# Patient Record
Sex: FEMALE | Race: WHITE | URBAN - METROPOLITAN AREA
[De-identification: names, ages, dates, MRNs, and addresses within clinical notes are randomized per-mention and may not be internally consistent; named-entity substitution may affect disease eponyms.]

---

## 2018-01-05 NOTE — TELEPHONE ENCOUNTER
APPT INFO    Date /Time: 1/10/18 at 2PM   Reason for Appt: NBS   Ref Provider/Clinic: Self   Are there internal records? Yes/No?  IF YES, list clinic names: No   Are there outside records? Yes/No? Yes   Patient Contact (Y/N) & Call Details: Called and spoke to pt, she has not seen anyone for this yet.  She did have her yearly physical last month and had labs done.  Emailed VALENTE to [email or web address]   Action:      OUTSIDE RECORDS CHECKLIST     CLINIC NAME COMMENTS REC (x) IMG (x)   [facility] Labs X none

## 2018-01-08 ENCOUNTER — CARE COORDINATION (OUTPATIENT)
Dept: SURGERY | Facility: CLINIC | Age: 38
End: 2018-01-08

## 2018-01-08 NOTE — TELEPHONE ENCOUNTER
Records Received From: [facility]     Date/Exam/Location  (specify location if different)   Office Notes: 12/21/17, 10/17/16, 10/13/16   Labs: 2017

## 2018-01-08 NOTE — PROGRESS NOTES
Patient interested in weight loss surgery.  Name: discuss recommendations  PCP: Farideh Bansal  Referred by: self    Ht: 5'4  Wt: 270  BMI: 46.34    Occupation/work status:     Insurance: [employer]  Instructed to check with insurance company regarding exclusions prior to first appt.    I would like to ask you some questions to see if you are a candidate for weight loss surgery with our program.    Verify Obesity related Co-Morbidities:  _n_Diabetes Mellitus  __I or__ II,         _n_Hypertension, __on meds  _n_High cholesterol, __ on meds  _n_Sleep Apnea,         __CPAP  _n_GERD, __on meds  _n_Degenerative Joint Disease, __ on meds    Other:  _n_Fibromyalgia  _n_Chronic abdominal pain, __treatment  _n_Dialysis    Abdominal Surgeries:  no    Psychiatric History:  _n_Depression, __ treatment:__  _n_Anxiety, __treatment:___  _n_Schizophrenia,  __ treatment:__  _n_Borderline personality disorder, __treatment  _n_Bipolar type I or type II, treatment: ___  _n_Suicide Attempts:   _n_Hospitalizations for mental health concerns:   Update/review Problem List   Occ. Beer, no smoking, no drugs    Support Systems:  , family friends    Scheduled to see Johnna Romero PA-C at 2 pm on 1/10/18, followed by an appt with a dietician.  Instructed to view seminar and complete pre-visit questionnaires prior to visit at [facility].

## 2018-01-10 ENCOUNTER — PRE VISIT (OUTPATIENT)
Dept: SURGERY | Facility: CLINIC | Age: 38
End: 2018-01-10

## 2018-01-10 ENCOUNTER — ALLIED HEALTH/NURSE VISIT (OUTPATIENT)
Dept: SURGERY | Facility: CLINIC | Age: 38
End: 2018-01-10
Payer: COMMERCIAL

## 2018-01-10 ENCOUNTER — OFFICE VISIT (OUTPATIENT)
Dept: SURGERY | Facility: CLINIC | Age: 38
End: 2018-01-10
Payer: COMMERCIAL

## 2018-01-10 ENCOUNTER — APPOINTMENT (OUTPATIENT)
Dept: LAB | Facility: CLINIC | Age: 38
End: 2018-01-10
Payer: COMMERCIAL

## 2018-01-10 VITALS
SYSTOLIC BLOOD PRESSURE: 120 MMHG | WEIGHT: 269.1 LBS | OXYGEN SATURATION: 98 % | DIASTOLIC BLOOD PRESSURE: 73 MMHG | BODY MASS INDEX: 44.83 KG/M2 | TEMPERATURE: 98.2 F | HEIGHT: 65 IN | HEART RATE: 67 BPM

## 2018-01-10 DIAGNOSIS — E66.01 MORBID OBESITY (H): Primary | ICD-10-CM

## 2018-01-10 LAB
ALBUMIN SERPL-MCNC: 3.6 G/DL (ref 3.4–5)
ALP SERPL-CCNC: 82 U/L (ref 40–150)
ALT SERPL W P-5'-P-CCNC: 62 U/L (ref 0–50)
ANION GAP SERPL CALCULATED.3IONS-SCNC: 7 MMOL/L (ref 3–14)
AST SERPL W P-5'-P-CCNC: 32 U/L (ref 0–45)
BILIRUB SERPL-MCNC: 0.4 MG/DL (ref 0.2–1.3)
BUN SERPL-MCNC: 16 MG/DL (ref 7–30)
CALCIUM SERPL-MCNC: 8.7 MG/DL (ref 8.5–10.1)
CHLORIDE SERPL-SCNC: 96 MMOL/L (ref 94–109)
CO2 SERPL-SCNC: 28 MMOL/L (ref 20–32)
CREAT SERPL-MCNC: 0.72 MG/DL (ref 0.52–1.04)
DEPRECATED CALCIDIOL+CALCIFEROL SERPL-MC: 21 UG/L (ref 20–75)
ERYTHROCYTE [DISTWIDTH] IN BLOOD BY AUTOMATED COUNT: 12.4 % (ref 10–15)
GFR SERPL CREATININE-BSD FRML MDRD: >90 ML/MIN/1.7M2
GLUCOSE SERPL-MCNC: 154 MG/DL (ref 70–99)
HCT VFR BLD AUTO: 40.7 % (ref 35–47)
HGB BLD-MCNC: 13.9 G/DL (ref 11.7–15.7)
MCH RBC QN AUTO: 29.5 PG (ref 26.5–33)
MCHC RBC AUTO-ENTMCNC: 34.2 G/DL (ref 31.5–36.5)
MCV RBC AUTO: 86 FL (ref 78–100)
PLATELET # BLD AUTO: 351 10E9/L (ref 150–450)
POTASSIUM SERPL-SCNC: 4 MMOL/L (ref 3.4–5.3)
PROT SERPL-MCNC: 8.3 G/DL (ref 6.8–8.8)
PTH-INTACT SERPL-MCNC: 62 PG/ML (ref 12–72)
RBC # BLD AUTO: 4.71 10E12/L (ref 3.8–5.2)
SODIUM SERPL-SCNC: 131 MMOL/L (ref 133–144)
WBC # BLD AUTO: 13.9 10E9/L (ref 4–11)

## 2018-01-10 RX ORDER — TRIAMTERENE/HYDROCHLOROTHIAZID 37.5-25 MG
1 TABLET ORAL EVERY MORNING
COMMUNITY

## 2018-01-10 NOTE — MR AVS SNAPSHOT
"                  MRN:[id number]                      After Visit Summary   1/10/2018    Megan Leos    MRN: [id number]           Visit Information        Provider Department      1/10/2018 1:30 PM Soo Simms RD [facility]        Care Instructions    GOALS:  Relating To Eating:  Eat slowly (20-30 minutes per meal), chewing foods well (25 chews per bite/applesauce consistency)  9\" Plate method (1/2 plate non-starchy vegetables/fruit, 1/4 plate lean protein, 1/4 plate whole grain starch - no more than 1/2 cup carb/meal)  Avoid snacking or 1 planned snack  Pack lunch for work at least once per week    Relating to beverages:  Separate fluids from meals by 30 minutes before, during, and after eating    Relating to dietary supplements:  Start a multivitamin containing iron daily    Relating to activity:  Increase activity level.  Exercise 2 days/week for 30 minutes    Soo Simms RD, LD    If you need to schedule or reschedule with a dietitian please call [phone].           [employer] Information     [employer] gives you secure access to your electronic health record. If you see a primary care provider, you can also send messages to your care team and make appointments. If you have questions, please call your primary care clinic.  If you do not have a primary care provider, please call [phone] and they will assist you.      [employer] is an electronic gateway that provides easy, online access to your medical records. With [employer], you can request a clinic appointment, read your test results, renew a prescription or communicate with your care team.     To access your existing account, please contact your [facility] or call [phone] for assistance.        Care EveryWhere ID     This is your Care EveryWhere ID. This could be used by other organizations to access your [facility] medical records  [id number]        Equal Access to Services     MONICA" GAAR : Hadii meghann Serra, washolada luqadaha, qaybta kaalmada javad, simran jones. [facility] [phone].    ATENCIÓN: Si habla español, tiene a lang disposición servicios gratuitos de asistencia lingüística. Llame al [phone].    We comply with applicable federal civil rights laws and [place] laws. We do not discriminate on the basis of race, color, national origin, age, disability, sex, sexual orientation, or gender identity.

## 2018-01-10 NOTE — PATIENT INSTRUCTIONS
"GOALS:  Relating To Eating:  Eat slowly (20-30 minutes per meal), chewing foods well (25 chews per bite/applesauce consistency)  9\" Plate method (1/2 plate non-starchy vegetables/fruit, 1/4 plate lean protein, 1/4 plate whole grain starch - no more than 1/2 cup carb/meal)  Avoid snacking or 1 planned snack  Pack lunch for work at least once per week    Relating to beverages:  Separate fluids from meals by 30 minutes before, during, and after eating    Relating to dietary supplements:  Start a multivitamin containing iron daily    Relating to activity:  Increase activity level.  Exercise 2 days/week for 30 minutes    Soo Simms, DANY, LD    If you need to schedule or reschedule with a dietitian please call [phone].    "

## 2018-01-10 NOTE — PATIENT INSTRUCTIONS
"Watch our online seminar at [email or web address]    See RD in 1 month and monthly for 3 months    [employer] phone calls x 5.     Labs today first floor     Bariatric Task List  Status:  Is patient a candidate for bariatric surgery?:  Yes -     Status:  surgery evaluation in process -     Surgeon: Dr Young -     Tentative surgery month/year: April 2018 -        Insurance: Insurance:  HP -     HP Referral:  Needed - best time is anytime on cell      Patient Info: Initial Weight:  269 lb 1.6 oz -     Date of Initial Weight/Height:  1/10/2018 -     Goal Weight (lbs):  259 -     Required Weight Loss:  10 -     Surgery Type:  sleeve gastrectomy -        Dietician Visits: Structured weight loss required by insurance?:  Yes -     Dietician Visit 1:  Completed -     Dietician Visit 2:  Needed -     Dietician Visit 3:  Needed -        Psychological Evaluation: Psych eval:  Needed -        Lab Work: Complete Blood Count:  Needed -     Comprehensive Metabolic Panel:  Needed -     Vitamin D:  Needed -     Hgb A1c:  Needed -     PTH:  Needed -        PCP: Establish care with PCP:  Completed -     PCP letter of support:  Needed -        Patient Education:  Information Session:  Needed -     Given \"Making your decision\" handout?:  Yes -     Given support group information?:  Yes -     Attended support group?:    -     Support plan in place?:  Completed -     Research consents signed?:  Yes -        Final Tasks:  Before surgery online class:  Needed -     Before surgery online class website link:  [email or web address]   After surgery online class:  Needed -     After surgery online class website link:  [email or web address]   Nurse visit for weigh-in and information:  Needed -     Pre-assessment clinic visit with anesthesia team for H&P:  Needed -     Final labs (Hgb, plt, T&S, UA):  Needed -        Notes:   -           "

## 2018-01-10 NOTE — MR AVS SNAPSHOT
"              After Visit Summary   1/10/2018    Megan Leos    MRN: [id number]           Patient Information     Date Of Birth          1980        Visit Information        Provider Department      1/10/2018 2:00 PM Johnna Romero PA-C [facility]        Today's Diagnoses     Morbid obesity (H)    -  1      Care Instructions    Watch our online seminar at [email or web address]    See RD in 1 month and monthly for 3 months    [facility] phone calls x 5.     Labs today first floor     Bariatric Task List  Status:  Is patient a candidate for bariatric surgery?:  Yes -     Status:  surgery evaluation in process -     Surgeon: Dr Young -     Tentative surgery month/year: April 2018 -        Insurance: Insurance:  HP -     HP Referral:  Needed - best time is anytime on cell      Patient Info: Initial Weight:  269 lb 1.6 oz -     Date of Initial Weight/Height:  1/10/2018 -     Goal Weight (lbs):  259 -     Required Weight Loss:  10 -     Surgery Type:  sleeve gastrectomy -        Dietician Visits: Structured weight loss required by insurance?:  Yes -     Dietician Visit 1:  Completed -     Dietician Visit 2:  Needed -     Dietician Visit 3:  Needed -        Psychological Evaluation: Psych eval:  Needed -        Lab Work: Complete Blood Count:  Needed -     Comprehensive Metabolic Panel:  Needed -     Vitamin D:  Needed -     Hgb A1c:  Needed -     PTH:  Needed -        PCP: Establish care with PCP:  Completed -     PCP letter of support:  Needed -        Patient Education:  Information Session:  Needed -     Given \"Making your decision\" handout?:  Yes -     Given support group information?:  Yes -     Attended support group?:    -     Support plan in place?:  Completed -     Research consents signed?:  Yes -        Final Tasks:  Before surgery online class:  Needed -     Before surgery online class website link:  [email or web address]   After surgery online class:  " "Needed -     After surgery online class website link:  [email or web address]   Nurse visit for weigh-in and information:  Needed -     Pre-assessment clinic visit with anesthesia team for H&P:  Needed -     Final labs (Hgb, plt, T&S, UA):  Needed -        Notes:   -                   Follow-ups after your visit        Who to contact     Please call your clinic at [phone] to:    Ask questions about your health    Make or cancel appointments    Discuss your medicines    Learn about your test results    Speak to your doctor   If you have compliments or concerns about an experience at your clinic, or if you wish to file a complaint, please contact [facility] Patient Relations at [phone] or email us at [email or web address]         Additional Information About Your Visit        [employer] Information     [employer] gives you secure access to your electronic health record. If you see a primary care provider, you can also send messages to your care team and make appointments. If you have questions, please call your primary care clinic.  If you do not have a primary care provider, please call [phone] and they will assist you.      [employer] is an electronic gateway that provides easy, online access to your medical records. With [employer], you can request a clinic appointment, read your test results, renew a prescription or communicate with your care team.     To access your existing account, please contact your [facility] or call [phone] for assistance.        Care EveryWhere ID     This is your Care EveryWhere ID. This could be used by other organizations to access your [facility] medical records  [id number]        Your Vitals Were     Pulse Temperature Height Pulse Oximetry BMI (Body Mass Index)       67 98.2  F (36.8  C) (Oral) 5' 4.57\" 98% 45.38 kg/m2        Blood Pressure from Last 3 Encounters:   01/10/18 120/73    Weight " from Last 3 Encounters:   01/10/18 269 lb 1.6 oz              We Performed the Following     CBC with platelets     Comprehensive metabolic panel     Parathyroid Hormone Intact     Vitamin D Deficiency        Primary Care Provider Office Phone # Fax #    BERENICE Fontana, NORMAN [phone]       [facility] [address]  [employer] [address]        Equal Access to Services     [facility]: Hadii aad ku hadasho Soomaali, waaxda luqadaha, qaybta kaalmada adeegyada, waxay idiin hayaan adeeg kharash la'aan . So [facility] [phone].    ATENCIÓN: Si habla español, tiene a lang disposición servicios gratuitos de asistencia lingüística. Sharan al [phone].    We comply with applicable federal civil rights laws and [place] laws. We do not discriminate on the basis of race, color, national origin, age, disability, sex, sexual orientation, or gender identity.            Thank you!     Thank you for choosing [facility]  for your care. Our goal is always to provide you with excellent care. Hearing back from our patients is one way we can continue to improve our services. Please take a few minutes to complete the written survey that you may receive in the mail after your visit with us. Thank you!             Your Updated Medication List - Protect others around you: Learn how to safely use, store and throw away your medicines at www.disposemymeds.org.          This list is accurate as of: 1/10/18  3:00 PM.  Always use your most recent med list.                   Brand Name Dispense Instructions for use Diagnosis    PREDNISONE PO      Take 10 mg by mouth        triamterene-hydrochlorothiazide 37.5-25 MG per tablet    MAXZIDE-25     Take 1 tablet by mouth daily

## 2018-01-10 NOTE — NURSING NOTE
"(   Chief Complaint   Patient presents with     Consult     NBS, BMI 46.3    )    ( Weight: 269 lb 1.6 oz )  ( Height: 5' 4.57\" )  ( BMI (Calculated): 45.48 )  ( Initial Weight: 269 lb 1.6 oz )  ( Cumulative weight loss (lbs): 0 )  (   )  (   )  ( Waist Circumference (cm): 133 cm )  (   )    ( BP: 120/73 )  (   )  ( Temp: 98.2  F (36.8  C) )  ( Temp src: Oral )  ( Pulse: 67 )  (   )  ( SpO2: 98 % )    ( There is no problem list on file for this patient.   )  (   Current Outpatient Prescriptions   Medication Sig Dispense Refill     PREDNISONE PO Take 10 mg by mouth       triamterene-hydrochlorothiazide (MAXZIDE-25) 37.5-25 MG per tablet Take 1 tablet by mouth daily      )  ( Diabetes Eval:    )    ( Pain Eval:  Data Unavailable )    ( Wound Eval:       )    (   History   Smoking Status     Former Smoker     Packs/day: 0.50     Years: 5.00     Types: Cigarettes     Start date: 1/7/2011     Quit date: 10/27/2016   Smokeless Tobacco     Never Used    )    ( Signed By:  Mallory Hughes; January 10, 2018; 2:50 PM )    "

## 2018-01-10 NOTE — PROGRESS NOTES
"New Bariatric Nutrition Consultation Note    Reason For Visit: Nutrition Assessment    Megan eLos is a 37 year old presenting today for new bariatric nutrition consult.   Pt is interested in laparoscopic sleeve gastrectomy with Dr. Young expected surgery in April.  This is pt's first of 3 required nutrition visits prior to surgery. Pt referred by Johnna NG(1/10/18).     She is interested in having weight loss surgery for the following reasons:  Trying for the past 17 years, difficulty loosing weight    Support System Reviewed With Patient 1/4/2018   Who do you have in your support network that can be available to help you for the first 2 weeks after surgery?  - he is a stay at home parent and will be with me the whole time   Who can you count on for support throughout your weight loss surgery journey? , kids, friends       ANTHROPOMETRICS:  Estimated body mass index is 45.38 kg/(m^2) as calculated from the following:    Height as of an earlier encounter on 1/10/18: 1.64 m (5' 4.57\").    Weight as of an earlier encounter on 1/10/18: 122.1 kg (269 lb 1.6 oz).    Required weight loss goal pre-op: 10 lbs from initial consult weight (goal weight 259 lbs or less before surgery)       1/4/2018   I have tried the following methods to lose weight Watching portions or calories, Exercise, Weight Watchers, Tami Brian, Pre packaged meals ex: Nutrisystem, Slimfast, OTC Medications       Weight Loss Questions Reviewed With Patient 1/4/2018   How long have you been overweight? Following one or more pregnancies       SUPPLEMENT INFORMATION:  Vit C, Vit D, B12    NUTRITION HISTORY:  Recall Diet Questions Reviewed With Patient 1/4/2018   Describe what you typically consume for breakfast (typical or most recent): Oatmeal, Eggs with León, Bagels, Fruit   Describe what you typically consume for lunch (typical or most recent): Fast food if out and about(2-3 days per week), Salads   Describe what you typically " consume for supper (typical or most recent): we cook at home 4-5 days a week - chicken and rice, other meat & veggie. We eat out 2-3 times per week - we like to eat [place]im[place], Mexican, etc.   Describe what you typically consume as snacks (typical or most recent): almonds, trail mix, fruit, cheese (1-2 times per day)   How many ounces of water, or other low calorie drinks, do you drink daily (8 oz=1 glass)? 64 oz or more   How many ounces of caffeine (coffee, tea, pop) do you drink daily (8 oz=1 glass)? 16 oz   How often do you drink alcohol? 2-4 times a month   If you do drink alcohol, how many drinks might you have in a day? (one drink = 5 oz. wine, 1 can/bottle of beer, 1 shot liquor) 1 or 2       Eating Habits 1/4/2018   Do you have any dietary restrictions? No   Do you currently binge eat (eat a large amount of food in a short time)? No   Are you an emotional eater? No   Do you get up to eat after falling asleep? No   What foods do you crave? carbs       ADDITIONAL INFORMATION:    Dining Out History Reviewed With Patient 1/4/2018   How often do you dine out? A couple of times a week.   Where do you dine out? (select all that apply) sit-down restaurants, fast food chains, take out   What types of food do you order when you dine out? I like meat & potatoes - hearty meals       Physical Activity Reviewed With Patient 1/4/2018   How often do you exercise? 1 to 2 times per week   What is the duration of your exercise (in minutes)? 30 Minutes   What types of exercise do you do? walking, group fitness classes   What keeps you from being more active?  Pain, Lack of Time, Too tired       NUTRITION DIAGNOSIS:  Obesity r/t long history of self-monitoring deficit and excessive energy intake aeb BMI >30.    INTERVENTION:  Intervention Provided/Education Provided on post-op diet guidelines, vitamins/minerals essential post-operatively, GI anatomy of bariatric surgeries, ways to help prepare for post-op diet guidelines  "pre-operatively, portion/calorie-control, mindful eating sources of protein.  Pt stated she struggles with portions at meals and often goes back for seconds or thirds and is not big into snacking. Provided pt with list of goals RD contact information.      Questions Reviewed With Patient 1/4/2018   How ready are you to make changes regarding your weight? Number 1 = Not ready at all to make changes up to 10 = very ready. 10   How confident are you that you can change? 1 = Not confident that you will be successful making changes up to 10 = very confident. 10       Patient Understanding: good  Expected Compliance: good    GOALS:  Relating To Eating:  Eat slowly (20-30 minutes per meal), chewing foods well (25 chews per bite/applesauce consistency)  9\" Plate method (1/2 plate non-starchy vegetables/fruit, 1/4 plate lean protein, 1/4 plate whole grain starch - no more than 1/2 cup carb/meal)  Avoid snacking or 1 planned snack  Pack lunch for work at least once per week    Relating to beverages:  Separate fluids from meals by 30 minutes before, during, and after eating    Relating to dietary supplements:  Start a multivitamin containing iron daily    Relating to activity:  Increase activity level.  Exercise 2 days/week for 30 minutes      Time spent with patient: 30 minutes.  Soo Simms, DANY, LD    "

## 2018-01-10 NOTE — PROGRESS NOTES
"New Bariatric Surgery Consultation Note    RE: Megan Leos  MR#: [id number]  : 1980      Referring provider: No flowsheet data found.    Chief Complaint/Reason for visit: evaluation for possible weight loss surgery    Dear Farideh Bansal, PA, PA-C (General),    I had the pleasure of seeing your patient, Megan Leos, to evaluate her obesity and consider her for possible weight loss surgery. As you know, Megan Leos is 37 year old.  She has a height of 5' 4.567\", a weight of 269 lbs 1.6 oz, and calculated Body mass index is 45.38 kg/(m^2).       HISTORY OF PRESENT ILLNESS:  Weight Loss History Reviewed with Patient 2018   How long have you been overweight? Following one or more pregnancies   What is the most that you have ever weighed? 275   What is the most weight you have lost? 40   I have tried the following methods to lose weight Watching portions or calories, Exercise, Weight Watchers, Tami Brian, Pre packaged meals ex: Nutrisystem, Slimfast, OTC Medications   I have tried the following weight loss medications? (Check all that apply) None   Have you ever had weight loss surgery? No       CO-MORBIDITIES OF OBESITY INCLUDE:     2018   I have the following co-morbidities associated with obesity: Weight Bearing Joint Pain       PAST MEDICAL HISTORY:  Past Medical History:   Diagnosis Date     Hearing problem     Menieres     Vision disorder     Astigmatism       PAST SURGICAL HISTORY:  History reviewed. No pertinent surgical history.    FAMILY HISTORY:   Family History   Problem Relation Age of Onset     DIABETES Maternal Grandmother      Colon Cancer Maternal Grandmother       from complications with colon cancer in      Obesity Maternal Grandmother      Prostate Cancer Maternal Grandfather      cured after 1yr of treatments     Thrombophilia Sister      Thrombophilia Sister        SOCIAL HISTORY:   Social History Questions Reviewed With Patient 2018   Which best " describes your employment status (select all that apply) I work full-time   If you work, what is your occupation? [employer] /    Which best describes your marital status:    Do you have children? Yes   Who do you have in your support network that can be available to help you for the first 2 weeks after surgery?  - he is a stay at home parent and will be with me the whole time   Who can you count on for support throughout your weight loss surgery journey? , kids, friends   Can you afford 3 meals a day?  Yes   Can you afford 50-60 dollars a month for vitamins? Yes   3 kids 16, 14, 3    HABITS:     1/4/2018   How often do you drink alcohol? 2-4 times a month   If you do drink alcohol, how many drinks might you have in a day? (one drink = 5 oz. wine, 1 can/bottle of beer, 1 shot liquor) 1 or 2   Do you currently use any of the following Nicotine products? No   Have you ever used any of the following nicotine products? Cigarettes   If you previously used any of these products, what year did you quit? 2016   Have you or are you currently using street drugs or prescription strength medication for which you do not have a prescription for? No   Do you have a history of chemical dependency (alcohol or drug abuse)? No       PSYCHOLOGICAL HISTORY:   Psychological History Reviewed With Patient 1/4/2018   Have you ever attempted suicide? Never.   Have you had thoughts of suicide in the past year? No   Have you ever been hospitalized for mental illness or a suicide attempt? Never.   Do you have a history of chronic pain? No   Have you ever been diagnosed with fibromyalgia? No   Are you currently being treated for any of the following? (select all that apply) I do not have a mental illness       ROS:     1/4/2018   Skin:  None of the above   HEENT: Dizziness/lightheadedness, Missing teeth   If you answered yes to missing teeth, please indicate how many: 1   Musculoskeletal: Joint Pain,  "Back pain   Cardiovascular: None of the above   Pulmonary: Snoring   Gastrointestinal: Constipation   Genitourinary: None of the above   Hematological: None of the above   Neurological: None of the above   Female only: Regular menstrual cycles       EATING BEHAVIORS:     1/4/2018   Have you or anyone else thought that you had an eating disorder? No   Do you currently binge eat (eat a large amount of food in a short time)? No   Are you an emotional eater? No   Do you get up to eat after falling asleep? No       EXERCISE:     1/4/2018   How often do you exercise? 1 to 2 times per week   What is the duration of your exercise (in minutes)? 30 Minutes   What types of exercise do you do? walking, group fitness classes   What keeps you from being more active?  Pain, Lack of Time, Too tired       MEDICATIONS:  Current Outpatient Prescriptions   Medication     PREDNISONE PO     triamterene-hydrochlorothiazide (MAXZIDE-25) 37.5-25 MG per tablet     No current facility-administered medications for this visit.        ALLERGIES:  Allergies   Allergen Reactions     Penicillins Hives and Itching     Latex Rash       LABS/IMAGING/MEDICAL RECORDS REVIEW:     PHYSICAL EXAM:  /73  Pulse 67  Temp 98.2  F (36.8  C) (Oral)  Ht 5' 4.57\"  Wt 269 lb 1.6 oz  SpO2 98%  BMI 45.38 kg/m2  General: NAD  Neurologic: A & O x 3, gait normal  Head: normocephalic, atraumatic  HEENT: PERRL, EOMI.   Respiratory: respirations unlabored  Abdomen: Obese, Soft NT ND   Extremities: No LE swelling   Skin: warm and dry.  No rashes on exposed skin  Psychiatric: Mentation and Affect appear normal      In summary, Megan Leos has Class III obesity with a body mass index of Body mass index is 45.38 kg/(m^2). kg/m2 and the comorbidities stated above. She completed an informational seminar and is a candidate for the laparoscopic gastric sleeve.  She will have to complete the following pre-requisites:    Watch our online seminar at " "[email or web address]    See RD in 1 month and monthly for 3 months    [employer] phone calls x 5. Best time to call is anytime on cell phone    Labs today first floor     Bariatric Task List  Status:  Is patient a candidate for bariatric surgery?:  Yes -     Status:  surgery evaluation in process -     Surgeon: Dr Young -     Tentative surgery month/year: April 2018 -        Insurance: Insurance:  HP -     HP Referral:  Needed - best time is anytime on cell      Patient Info: Initial Weight:  269 lb 1.6 oz -     Date of Initial Weight/Height:  1/10/2018 -     Goal Weight (lbs):  259 -     Required Weight Loss:  10 -     Surgery Type:  sleeve gastrectomy -        Dietician Visits: Structured weight loss required by insurance?:  Yes -     Dietician Visit 1:  Completed -     Dietician Visit 2:  Needed -     Dietician Visit 3:  Needed -        Psychological Evaluation: Psych eval:  Needed -        Lab Work: Complete Blood Count:  Needed -     Comprehensive Metabolic Panel:  Needed -     Vitamin D:  Needed -     Hgb A1c:  Needed -     PTH:  Needed -        PCP: Establish care with PCP:  Completed -     PCP letter of support:  Needed -        Patient Education:  Information Session:  Needed -     Given \"Making your decision\" handout?:  Yes -     Given support group information?:  Yes -     Attended support group?:    -     Support plan in place?:  Completed -     Research consents signed?:  Yes -        Final Tasks:  Before surgery online class:  Needed -     Before surgery online class website link:  [email or web address]   After surgery online class:  Needed -     After surgery online class website link:  [email or web address]   Nurse visit for weigh-in and information:  Needed -     Pre-assessment clinic visit with anesthesia team for H&P:  Needed -     Final labs (Hgb, plt, T&S, UA):  Needed -        Notes:   -       Today in the office we discussed gastric sleeve surgery. Preoperative, " perioperative, and postoperative processes, management, and follow up were addressed.  Risks and benefits were outlined including the risk of death, staple line leak (1-2%), PE, DVT, ulcer, worsening GERD, N/V, stricture, hernia, wound infection, weight regain, and vitamin deficiencies. I emphasized exercise and activity along with appropriate food choice as the main foundation for weight loss with surgery providing surgical reinforcement of this.  All questions were answered.  A goal sheet and support group handout were given to the patient.      Once the patient has completed the requirements in their task list and there are no further recommendations, the pt will be allowed to see the surgeon of her choice for consultation on the laparoscopic gastric sleeve surgery. Patient verbalizes understanding of the process to surgery and expectations for the postoperative period including the need for lifelong lifestyle changes, vitamin supplementation, and laboratory monitoring.     Sincerely,    Johnna Romero PA-C    I spent a total of 30 minutes face to face with Megan during today's office visit. Over 50% of this time was spent counseling the patient and/or coordinating care.

## 2018-01-10 NOTE — LETTER
"1/10/2018       RE: Megan Leos  [address]  [facility] [address]     Dear Colleague,    Thank you for referring your patient, Megan Leos, to the [facility] SURGICAL WE[facility] MANAGEMENT at [facility]. Please see a copy of my visit note below.    New Bariatric Surgery Consultation Note    RE: Megan Leos  MR#: [id number]  : 1980      Referring provider: No flowsheet data found.    Chief Complaint/Reason for visit: evaluation for possible weight loss surgery    Dear Farideh Bansal, PA, PA-C (General),    I had the pleasure of seeing your patient, Megan Leos, to evaluate her obesity and consider her for possible weight loss surgery. As you know, Megan Leos is 37 year old.  She has a height of 5' 4.567\", a weight of 269 lbs 1.6 oz, and calculated Body mass index is 45.38 kg/(m^2).       HISTORY OF PRESENT ILLNESS:  Weight Loss History Reviewed with Patient 2018   How long have you been overweight? Following one or more pregnancies   What is the most that you have ever weighed? 275   What is the most weight you have lost? 40   I have tried the following methods to lose weight Watching portions or calories, Exercise, Weight Watchers, Tami Brian, Pre packaged meals ex: Nutrisystem, Slimfast, OTC Medications   I have tried the following weight loss medications? (Check all that apply) None   Have you ever had weight loss surgery? No       CO-MORBIDITIES OF OBESITY INCLUDE:     2018   I have the following co-morbidities associated with obesity: Weight Bearing Joint Pain       PAST MEDICAL HISTORY:  Past Medical History:   Diagnosis Date     Hearing problem     Menieres     Vision disorder     Astigmatism       PAST SURGICAL HISTORY:  History reviewed. No pertinent surgical history.    FAMILY HISTORY:   Family History   Problem Relation Age of Onset     DIABETES Maternal Grandmother      Colon Cancer Maternal Grandmother       from " complications with colon cancer in 2000     Obesity Maternal Grandmother      Prostate Cancer Maternal Grandfather      cured after 1yr of treatments     Thrombophilia Sister      Thrombophilia Sister        SOCIAL HISTORY:   Social History Questions Reviewed With Patient 1/4/2018   Which best describes your employment status (select all that apply) I work full-time   If you work, what is your occupation? [employer] /    Which best describes your marital status:    Do you have children? Yes   Who do you have in your support network that can be available to help you for the first 2 weeks after surgery?  - he is a stay at home parent and will be with me the whole time   Who can you count on for support throughout your weight loss surgery journey? , kids, friends   Can you afford 3 meals a day?  Yes   Can you afford 50-60 dollars a month for vitamins? Yes   3 kids 16, 14, 3    HABITS:     1/4/2018   How often do you drink alcohol? 2-4 times a month   If you do drink alcohol, how many drinks might you have in a day? (one drink = 5 oz. wine, 1 can/bottle of beer, 1 shot liquor) 1 or 2   Do you currently use any of the following Nicotine products? No   Have you ever used any of the following nicotine products? Cigarettes   If you previously used any of these products, what year did you quit? 2016   Have you or are you currently using street drugs or prescription strength medication for which you do not have a prescription for? No   Do you have a history of chemical dependency (alcohol or drug abuse)? No       PSYCHOLOGICAL HISTORY:   Psychological History Reviewed With Patient 1/4/2018   Have you ever attempted suicide? Never.   Have you had thoughts of suicide in the past year? No   Have you ever been hospitalized for mental illness or a suicide attempt? Never.   Do you have a history of chronic pain? No   Have you ever been diagnosed with fibromyalgia? No   Are you currently  "being treated for any of the following? (select all that apply) I do not have a mental illness       ROS:     1/4/2018   Skin:  None of the above   HEENT: Dizziness/lightheadedness, Missing teeth   If you answered yes to missing teeth, please indicate how many: 1   Musculoskeletal: Joint Pain, Back pain   Cardiovascular: None of the above   Pulmonary: Snoring   Gastrointestinal: Constipation   Genitourinary: None of the above   Hematological: None of the above   Neurological: None of the above   Female only: Regular menstrual cycles       EATING BEHAVIORS:     1/4/2018   Have you or anyone else thought that you had an eating disorder? No   Do you currently binge eat (eat a large amount of food in a short time)? No   Are you an emotional eater? No   Do you get up to eat after falling asleep? No       EXERCISE:     1/4/2018   How often do you exercise? 1 to 2 times per week   What is the duration of your exercise (in minutes)? 30 Minutes   What types of exercise do you do? walking, group fitness classes   What keeps you from being more active?  Pain, Lack of Time, Too tired       MEDICATIONS:  Current Outpatient Prescriptions   Medication     PREDNISONE PO     triamterene-hydrochlorothiazide (MAXZIDE-25) 37.5-25 MG per tablet     No current facility-administered medications for this visit.        ALLERGIES:  Allergies   Allergen Reactions     Penicillins Hives and Itching     Latex Rash       LABS/IMAGING/MEDICAL RECORDS REVIEW:     PHYSICAL EXAM:  /73  Pulse 67  Temp 98.2  F (36.8  C) (Oral)  Ht 5' 4.57\"  Wt 269 lb 1.6 oz  SpO2 98%  BMI 45.38 kg/m2  General: NAD  Neurologic: A & O x 3, gait normal  Head: normocephalic, atraumatic  HEENT: PERRL, EOMI.   Respiratory: respirations unlabored  Abdomen: Obese, Soft NT ND   Extremities: No LE swelling   Skin: warm and dry.  No rashes on exposed skin  Psychiatric: Mentation and Affect appear normal      In summary, Megan Leos has Class III obesity with a " "body mass index of Body mass index is 45.38 kg/(m^2). kg/m2 and the comorbidities stated above. She completed an informational seminar and is a candidate for the laparoscopic gastric sleeve.  She will have to complete the following pre-requisites:    Watch our online seminar at [email or web address]    See RD in 1 month and monthly for 3 months    [employer] phone calls x 5. Best time to call is anytime on cell phone    Labs today first floor     Bariatric Task List  Status:  Is patient a candidate for bariatric surgery?:  Yes -     Status:  surgery evaluation in process -     Surgeon: Dr Young -     Tentative surgery month/year: April 2018 -        Insurance: Insurance:  HP -     HP Referral:  Needed - best time is anytime on cell      Patient Info: Initial Weight:  269 lb 1.6 oz -     Date of Initial Weight/Height:  1/10/2018 -     Goal Weight (lbs):  259 -     Required Weight Loss:  10 -     Surgery Type:  sleeve gastrectomy -        Dietician Visits: Structured weight loss required by insurance?:  Yes -     Dietician Visit 1:  Completed -     Dietician Visit 2:  Needed -     Dietician Visit 3:  Needed -        Psychological Evaluation: Psych eval:  Needed -        Lab Work: Complete Blood Count:  Needed -     Comprehensive Metabolic Panel:  Needed -     Vitamin D:  Needed -     Hgb A1c:  Needed -     PTH:  Needed -        PCP: Establish care with PCP:  Completed -     PCP letter of support:  Needed -        Patient Education:  Information Session:  Needed -     Given \"Making your decision\" handout?:  Yes -     Given support group information?:  Yes -     Attended support group?:    -     Support plan in place?:  Completed -     Research consents signed?:  Yes -        Final Tasks:  Before surgery online class:  Needed -     Before surgery online class website link:  [email or web address]   After surgery online class:  Needed -     After surgery online class website link:  " [email or web address]   Nurse visit for weigh-in and information:  Needed -     Pre-assessment clinic visit with anesthesia team for H&P:  Needed -     Final labs (Hgb, plt, T&S, UA):  Needed -        Notes:   -       Today in the office we discussed gastric sleeve surgery. Preoperative, perioperative, and postoperative processes, management, and follow up were addressed.  Risks and benefits were outlined including the risk of death, staple line leak (1-2%), PE, DVT, ulcer, worsening GERD, N/V, stricture, hernia, wound infection, weight regain, and vitamin deficiencies. I emphasized exercise and activity along with appropriate food choice as the main foundation for weight loss with surgery providing surgical reinforcement of this.  All questions were answered.  A goal sheet and support group handout were given to the patient.      Once the patient has completed the requirements in their task list and there are no further recommendations, the pt will be allowed to see the surgeon of her choice for consultation on the laparoscopic gastric sleeve surgery. Patient verbalizes understanding of the process to surgery and expectations for the postoperative period including the need for lifelong lifestyle changes, vitamin supplementation, and laboratory monitoring.     Sincerely,    Johnna Romero PA-C    I spent a total of 30 minutes face to face with Megan during today's office visit. Over 50% of this time was spent counseling the patient and/or coordinating care.      Again, thank you for allowing me to participate in the care of your patient.      Sincerely,    Johnna Romero PA-C

## 2018-01-17 PROBLEM — E66.01 MORBID OBESITY (H): Status: ACTIVE | Noted: 2018-01-17

## 2018-01-17 PROBLEM — M25.50 JOINT PAIN: Status: ACTIVE | Noted: 2018-01-17

## 2018-01-29 ENCOUNTER — HEALTH MAINTENANCE LETTER (OUTPATIENT)
Age: 38
End: 2018-01-29

## 2018-02-08 ENCOUNTER — ALLIED HEALTH/NURSE VISIT (OUTPATIENT)
Dept: SURGERY | Facility: CLINIC | Age: 38
End: 2018-02-08
Payer: COMMERCIAL

## 2018-02-08 VITALS — WEIGHT: 271.4 LBS | BODY MASS INDEX: 45.77 KG/M2

## 2018-02-08 NOTE — PATIENT INSTRUCTIONS
"GOALS:  Relating To Eating:  Eat slowly (20-30 minutes per meal), chewing foods well (25 chews per bite/applesauce consistency)  9\" Plate method (1/2 plate non-starchy vegetables/fruit, 1/4 plate lean protein, 1/4 plate whole grain starch - no more than 1/2 cup carb/meal)  Avoid snacking or 1 planned snack  Pack lunch for work at least once per week    Relating to beverages:  Separate fluids from meals by 30 minutes before, during, and after eating    Relating to dietary supplements:  Start a multivitamin containing iron daily    Relating to activity:  Increase activity level.  Exercise 2 days/week for 30 minutes    May start using protein shake as a meal replacement-  *Protein Shake Criteria: no more than 250 Calories, at least 20 grams of protein, and less than 10 grams of sugar     Meal Replacement Shake Options:   [employer] Meal Replacement (250 Calories, 35 g protein)   Premier Protein (160 Calories, 30 g protein)  Slim Fast Advanced Nutrition (180 Calories, 20 g protein)  Muscle Milk, lactose-free, 17 oz bottle (210 Calories, 30 g protein)  Integrated Supplements, no artificial sugars (110 Calories, 20 g protein)    Soo Simms, DANY, LD  If you need to schedule or reschedule with a dietitian please call [phone].    "

## 2018-02-08 NOTE — PROGRESS NOTES
"Bariatric Nutrition Consultation Note    Reason For Visit: Nutrition Assessment    Megan Leos is a 37 year old presenting today for bariatric nutrition consult.   Pt is interested in laparoscopic sleeve gastrectomy with Dr. Young expected surgery in April.  This is pt's second of 3 required nutrition visits prior to surgery. Pt referred by Johnna NG(1/10/18).     Coordination note: would like surgery early May not April.  Psychology appointment made for March 15th.    She is interested in having weight loss surgery for the following reasons:  Trying for the past 17 years, difficulty loosing weight    Support System Reviewed With Patient 1/4/2018   Who do you have in your support network that can be available to help you for the first 2 weeks after surgery?  - he is a stay at home parent and will be with me the whole time   Who can you count on for support throughout your weight loss surgery journey? , kids, friends       ANTHROPOMETRICS:  Estimated body mass index is 45.38 kg/(m^2) as calculated from the following:    Height as of 1/10/18: 1.64 m (5' 4.57\").    Initial weight as of 1/10/18: 122.1 kg (269 lb 1.6 oz).  Current weight: 271.4 lbs (+2.3 lbs since initial weight)  *patient stated she was on prednisone for three weeks causing weight gain/difficulty to loose weight.  Required weight loss goal pre-op: 10 lbs from initial consult weight (goal weight 259 lbs or less before surgery)       1/4/2018   I have tried the following methods to lose weight Watching portions or calories, Exercise, Weight Watchers, Tami Torres, Pre packaged meals ex: Nutrisystem, Slimfast, OTC Medications       Weight Loss Questions Reviewed With Patient 1/4/2018   How long have you been overweight? Following one or more pregnancies       SUPPLEMENT INFORMATION:  Vit C, Vit D, B12    NUTRITION HISTORY:    GOALS:  Relating To Eating:  Eat slowly (20-30 minutes per meal), chewing foods well (25 chews per " "bite/applesauce consistency) - continues  9\" Plate method (1/2 plate non-starchy vegetables/fruit, 1/4 plate lean protein, 1/4 plate whole grain starch - no more than 1/2 cup carb/meal) - most difficult at lunch  Avoid snacking or 1 planned snack - vegetables with low fat yogurt dressing (only prior to dinner)  Pack lunch for work at least once per week - soups at lunch not as high in protein, only going into the office once per week, eating at home    Relating to beverages:  Separate fluids from meals by 30 minutes before, during, and after eating - 90% of the time, more difficult with spicy foods    Relating to dietary supplements:  Start a multivitamin containing iron daily - met/continues MV, vitamin B12, vitamin D3 1,000 IU    Relating to activity:  Increase activity level.  Exercise 2 days/week for 30 minutes - met/continues, apps 3-4 ties per day 3-4 ties per week.  Planning on signing up at [employer] for strength training.    NUTRITION DIAGNOSIS:  Obesity r/t long history of self-monitoring deficit and excessive energy intake aeb BMI >30.    INTERVENTION:  Intervention Provided/Education Provided.  Reviewed previous goals and praised patient on progress with goals despite weight gain due to prednisone use.  Discussed the modified liquid diet and use of protein shakes as a meal replacement.  Patient stated she has some protein shakes at home and will review if they meet the guidelines.  Provided pt with list of goals RD contact information.      Questions Reviewed With Patient 1/4/2018   How ready are you to make changes regarding your weight? Number 1 = Not ready at all to make changes up to 10 = very ready. 10   How confident are you that you can change? 1 = Not confident that you will be successful making changes up to 10 = very confident. 10       Patient Understanding: good  Expected Compliance: good    GOALS:  Relating To Eating:  Eat slowly (20-30 minutes per meal), chewing foods well (25 chews " "per bite/applesauce consistency)  9\" Plate method (1/2 plate non-starchy vegetables/fruit, 1/4 plate lean protein, 1/4 plate whole grain starch - no more than 1/2 cup carb/meal)  Avoid snacking or 1 planned snack  Pack lunch for work at least once per week    Relating to beverages:  Separate fluids from meals by 30 minutes before, during, and after eating    Relating to dietary supplements:  Start a multivitamin containing iron daily    Relating to activity:  Increase activity level.  Exercise 2 days/week for 30 minutes    May start using protein shake as a meal replacement-  *Protein Shake Criteria: no more than 250 Calories, at least 20 grams of protein, and less than 10 grams of sugar     Meal Replacement Shake Options:   [facility] Meal Replacement (250 Calories, 35 g protein)   Premier Protein (160 Calories, 30 g protein)  Slim Fast Advanced Nutrition (180 Calories, 20 g protein)  Muscle Milk, lactose-free, 17 oz bottle (210 Calories, 30 g protein)  Integrated Supplements, no artificial sugars (110 Calories, 20 g protein)      Time spent with patient: 30 minutes.  Soo Simms, RD, LD  "

## 2018-03-01 ENCOUNTER — TRANSFERRED RECORDS (OUTPATIENT)
Dept: HEALTH INFORMATION MANAGEMENT | Facility: CLINIC | Age: 38
End: 2018-03-01

## 2018-03-01 ENCOUNTER — MEDICAL CORRESPONDENCE (OUTPATIENT)
Dept: HEALTH INFORMATION MANAGEMENT | Facility: CLINIC | Age: 38
End: 2018-03-01

## 2018-03-14 ENCOUNTER — ALLIED HEALTH/NURSE VISIT (OUTPATIENT)
Dept: SURGERY | Facility: CLINIC | Age: 38
End: 2018-03-14
Payer: COMMERCIAL

## 2018-03-14 ENCOUNTER — CARE COORDINATION (OUTPATIENT)
Dept: SURGERY | Facility: CLINIC | Age: 38
End: 2018-03-14

## 2018-03-14 VITALS — WEIGHT: 259.4 LBS | BODY MASS INDEX: 43.75 KG/M2

## 2018-03-14 NOTE — PATIENT INSTRUCTIONS
"GOALS:  Relating To Eating:  Eat slowly (20-30 minutes per meal), chewing foods well (25 chews per bite/applesauce consistency)  9\" Plate method (1/2 plate non-starchy vegetables/fruit, 1/4 plate lean protein, 1/4 plate whole grain starch - no more than 1/2 cup carb/meal)  Avoid snacking or 1 planned snack  Pack lunch for work at least once per week    Relating to beverages:  Separate fluids from meals by 30 minutes before, during, and after eating    Relating to dietary supplements:  Start a multivitamin containing iron daily    Relating to activity:  Increase activity level.  Exercise 2 days/week for 30 minutes    Follow the Modified Liquid Diet for weight loss:  Breakfast: Protein Shake  Lunch: Protein Shake  Supper: 3 oz lean protein + non-starchy vegetables  Snack: non-starchy vegetables (no calorie-containing dips/condiments)  Beverages: at least 48-64 oz water between meals daily    *Protein Shake Criteria: no more than 250 Calories, at least 20 grams of protein, and less than 10 grams of sugar       After surgery goals:  1. Follow the bariatric post-op diet advancement schedule:  - Bariatric clear liquid diet through post-op day 1 (while in the hospital).  - Bariatric low-fat full liquid diet on post-op days 2 through 13 (2 weeks).  2. Sip on 48-64 oz (or greater) fluids daily, recording intake to help stay on-track.  - Drink at least 2 oz of fluid every 30 min.    Soo Simms, DANY, LD  If you need to schedule or reschedule with a dietitian please call [phone].    "

## 2018-03-14 NOTE — PROGRESS NOTES
"Tasklist updated and copy sent to client via [employer].    Bariatric Task List  Status:  Is patient a candidate for bariatric surgery?:  Yes -     Cleared to schedule surgeon consult?:    -     Status:  surgery evaluation in process -     Surgeon: Dr Young -     Tentative surgery month/year: April or May 2018 -        Insurance: Insurance:  HP -     HP Referral:  Needed - best time is anytime on cell, to be completed week of 3/22/18.      Patient Info: Initial Weight:  269 lb 1.6 oz -     Date of Initial Weight/Height:  1/10/2018 -     Goal Weight (lbs):  259 -     Required Weight Loss:  10 -     Surgery Type:  sleeve gastrectomy -     Multidisciplinary Meeting:    -        Dietician Visits: Structured weight loss required by insurance?:  Yes -     Dietician Visit 1:  Completed - 1/10/18.   Dietician Visit 2:  Completed - 2/8/18 MV   Dietician Visit 3:  Completed - 3/14/18 done.      Psychological Evaluation: Psych eval:  Needed - Dr Wright appt March 15th      Lab Work: Complete Blood Count:  Completed -     Comprehensive Metabolic Panel:  Completed -     Vitamin D:  Completed -     PTH:  Completed -        PCP: Establish care with PCP:  Completed -     Follow up with PCP:    -     PCP letter of support:  Completed - 3/1/18 PCP ltr from Farideh Bansal PA-C      Patient Education:  Information Session:  Needed - Please view at [email or web address] and take the quiz afterwards.   Given \"Making your decision\" handout?:  Yes -     Given support group information?:  Yes -     Attended support group?:    -     Support plan in place?:  Completed -     Research consents signed?:  Yes -        Final Tasks:  Before surgery online class:  Needed -     Before surgery online class website link:  [email or web address]   After surgery online class:  Needed -     After surgery online class website link:  [email or web address]   Nurse visit for weigh-in and information:  Needed -     Pre-assessment clinic visit " with anesthesia team for H&P:  Needed -     Final labs (Hgb, plt, T&S, UA):  Needed -        Notes:   -

## 2018-03-14 NOTE — PROGRESS NOTES
"Bariatric Nutrition Consultation Note    Reason For Visit: Nutrition Assessment    Megan Leos is a 37 year old presenting today for bariatric nutrition consult.   Pt is interested in laparoscopic sleeve gastrectomy with Dr. Young expected surgery in April.  This is pt's 3rd of 3 required nutrition visits prior to surgery, post op diet education provided. Pt referred by Johnna NG(1/10/18).     Coordination note: would like surgery early May not April.  Psychology appointment made for March 15th.  Last  phone call scheduled for next week.  PCP letter of support faxed.    She is interested in having weight loss surgery for the following reasons:  Trying for the past 17 years, difficulty loosing weight    ANTHROPOMETRICS:  Estimated body mass index is 45.38 kg/(m^2) as calculated from the following:    Height as of 1/10/18: 1.64 m (5' 4.57\").    Initial weight as of 1/10/18: 122.1 kg (269 lb 1.6 oz).  Current weight: 259.4 lbs (-12 lbs in the past month, -9.7 lbs since initial weight)    Required weight loss goal pre-op: 10 lbs from initial consult weight (goal weight 259 lbs or less before surgery)    SUPPLEMENT INFORMATION:  Vit C, Vit D3 1,000 IU, B12    NUTRITION HISTORY:    GOALS:  Relating To Eating:  Eat slowly (20-30 minutes per meal), chewing foods well (25 chews per bite/applesauce consistency) continues  9\" Plate method (1/2 plate non-starchy vegetables/fruit, 1/4 plate lean protein, 1/4 plate whole grain starch - no more than 1/2 cup carb/meal) met/continues  Avoid snacking or 1 planned snack hungry at night- string cheese or pickle  Pack lunch for work at least once per week -met/continues    Relating to beverages:  Separate fluids from meals by 30 minutes before, during, and after eating  Met, easy now    Relating to dietary supplements:  Start a multivitamin containing iron daily met    Relating to activity:  Increase activity level.  Exercise 2 days/week for 30 minutes - 4 days last week " "[employer]    May start using protein shake as a meal replacement- following      NUTRITION DIAGNOSIS:  Obesity r/t long history of self-monitoring deficit and excessive energy intake aeb BMI >30.  and  Food- and Nutrition-related knowledge deficit r/t lack of prior exposure to information AEB pt unable to verbalize main points of bariatric clear and low-fat full liquid diet.    INTERVENTION:  Intervention Provided/Education Provided.  Praised patient on progress with goals and weigh loss over the past month.  Patient stated that she followed the modified liquid diet except for three days when she had company visit and recorded what she ate.    Provided instruction on bariatric clear and low-fat full liquid diets.  Provided the following handouts: Diet Guidelines for Bariatric Surgery, Sources of Protein, Keeping Track of Your Fluids, list of recommended vitamin/mineral supplementation after SG surgery and RD contact information.    Questions Reviewed With Patient 1/4/2018   How ready are you to make changes regarding your weight? Number 1 = Not ready at all to make changes up to 10 = very ready. 10   How confident are you that you can change? 1 = Not confident that you will be successful making changes up to 10 = very confident. 10       Patient Understanding: good  Expected Compliance: good    GOALS:  Relating To Eating:  Eat slowly (20-30 minutes per meal), chewing foods well (25 chews per bite/applesauce consistency)  9\" Plate method (1/2 plate non-starchy vegetables/fruit, 1/4 plate lean protein, 1/4 plate whole grain starch - no more than 1/2 cup carb/meal)  Avoid snacking or 1 planned snack  Pack lunch for work at least once per week    Relating to beverages:  Separate fluids from meals by 30 minutes before, during, and after eating    Relating to dietary supplements:  Start a multivitamin containing iron daily    Relating to activity:  Increase activity level.  Exercise 2 days/week for 30 " minutes    Follow the Modified Liquid Diet for weight loss:  Breakfast: Protein Shake  Lunch: Protein Shake  Supper: 3 oz lean protein + non-starchy vegetables  Snack: non-starchy vegetables (no calorie-containing dips/condiments)  Beverages: at least 48-64 oz water between meals daily    *Protein Shake Criteria: no more than 250 Calories, at least 20 grams of protein, and less than 10 grams of sugar       After surgery goals:  1. Follow the bariatric post-op diet advancement schedule:  - Bariatric clear liquid diet through post-op day 1 (while in the hospital).  - Bariatric low-fat full liquid diet on post-op days 2 through 13 (2 weeks).  2. Sip on 48-64 oz (or greater) fluids daily, recording intake to help stay on-track.  - Drink at least 2 oz of fluid every 30 min.    Time spent with patient: 30 minutes.  Soo Simms, RD, LD

## 2018-03-14 NOTE — MR AVS SNAPSHOT
"                  MRN:[id number]                      After Visit Summary   3/14/2018    Megan Leos    MRN: [id number]           Visit Information        Provider Department      3/14/2018 8:00 AM Soo Simms RD [facility] Surgical Weight Management        Your next 10 appointments already scheduled     Mar 15, 2018  9:00 AM CDT   (Arrive by 8:45 AM)   PSYCH EVALUATION with Laurie Wright, PhD LP   [facility] Neuropsychology ([facility])    [address] [place] [address] [phone]              Care Instructions    GOALS:  Relating To Eating:  Eat slowly (20-30 minutes per meal), chewing foods well (25 chews per bite/applesauce consistency)  9\" Plate method (1/2 plate non-starchy vegetables/fruit, 1/4 plate lean protein, 1/4 plate whole grain starch - no more than 1/2 cup carb/meal)  Avoid snacking or 1 planned snack  Pack lunch for work at least once per week    Relating to beverages:  Separate fluids from meals by 30 minutes before, during, and after eating    Relating to dietary supplements:  Start a multivitamin containing iron daily    Relating to activity:  Increase activity level.  Exercise 2 days/week for 30 minutes    Follow the Modified Liquid Diet for weight loss:  Breakfast: Protein Shake  Lunch: Protein Shake  Supper: 3 oz lean protein + non-starchy vegetables  Snack: non-starchy vegetables (no calorie-containing dips/condiments)  Beverages: at least 48-64 oz water between meals daily    *Protein Shake Criteria: no more than 250 Calories, at least 20 grams of protein, and less than 10 grams of sugar       After surgery goals:  1. Follow the bariatric post-op diet advancement schedule:  - Bariatric clear liquid diet through post-op day 1 (while in the hospital).  - Bariatric low-fat full liquid diet on post-op days 2 through 13 (2 weeks).  2. Sip on 48-64 oz (or greater) fluids daily, recording intake to help stay on-track.  - Drink " at least 2 oz of fluid every 30 min.    Soo Simms RD, LD  If you need to schedule or reschedule with a dietitian please call [phone].           [employer] Information     [employer] gives you secure access to your electronic health record. If you see a primary care provider, you can also send messages to your care team and make appointments. If you have questions, please call your primary care clinic.  If you do not have a primary care provider, please call [phone] and they will assist you.      [employer] is an electronic gateway that provides easy, online access to your medical records. With [employer], you can request a clinic appointment, read your test results, renew a prescription or communicate with your care team.     To access your existing account, please contact your [facility] or call [phone] for assistance.        Care EveryWhere ID     This is your Care EveryWhere ID. This could be used by other organizations to access your [place] medical records  [id number]        Equal Access to Services     MONICA AMADOR : Hadii meghann bauman hadasho Sodennisali, waaxda luqadaha, qaybta kaalmada adeegroman, simran jones. So [facility] [phone].    ATENCIÓN: Si habla español, tiene a lang disposición servicios gratuitos de asistencia lingüística. Llame al [phone].    We comply with applicable federal civil rights laws and [place] laws. We do not discriminate on the basis of race, color, national origin, age, disability, sex, sexual orientation, or gender identity.

## 2018-03-15 ENCOUNTER — OFFICE VISIT (OUTPATIENT)
Dept: NEUROPSYCHOLOGY | Facility: CLINIC | Age: 38
End: 2018-03-15
Payer: COMMERCIAL

## 2018-03-15 DIAGNOSIS — E66.01 MORBID OBESITY (H): ICD-10-CM

## 2018-03-15 DIAGNOSIS — F54 PSYCHOLOGICAL FACTORS AFFECTING MEDICAL CONDITION: Primary | ICD-10-CM

## 2018-03-15 NOTE — MR AVS SNAPSHOT
After Visit Summary   3/15/2018    Megan Leos    MRN: [id number]           Patient Information     Date Of Birth          1980        Visit Information        Provider Department      3/15/2018 9:00 AM Laurie Wright, PhD [facility] Neuropsychology        Today's Diagnoses     Psychological factors affecting medical condition    -  1    Morbid obesity (H)           Follow-ups after your visit        Your next 10 appointments already scheduled     Mar 22, 2018  7:20 AM CDT   (Arrive by 7:05 AM)   Pre Bariatric Surgery with Olman Young MD   [facility] Surgical Weight Management ([facility])    [address]  [address] [facility] [address] [phone]              Who to contact     Please call your clinic at [phone] to:    Ask questions about your health    Make or cancel appointments    Discuss your medicines    Learn about your test results    Speak to your doctor            Additional Information About Your Visit        [employer] Information     [employer] gives you secure access to your electronic health record. If you see a primary care provider, you can also send messages to your care team and make appointments. If you have questions, please call your primary care clinic.  If you do not have a primary care provider, please call [phone] and they will assist you.      [employer] is an electronic gateway that provides easy, online access to your medical records. With [employer], you can request a clinic appointment, read your test results, renew a prescription or communicate with your care team.     To access your existing account, please contact your [facility] or call [phone] for assistance.        Care EveryWhere ID     This is your Care EveryWhere ID. This could be used by other organizations to access your [place] medical records  [id number]         Blood Pressure from Last 3 Encounters:    01/10/18 120/73    Weight from Last 3 Encounters:   03/14/18 117.7 kg (259 lb 6.4 oz)   02/08/18 123.1 kg (271 lb 6.4 oz)   01/10/18 122.1 kg (269 lb 1.6 oz)              We Performed the Following     [id number] TEST BY PSYCHOLOGIST/MD, PER HR     C PSYCHIATRIC DIAGNOSTIC EVALUATION        Primary Care Provider Office Phone # Fax #    BERENICE [place], NORMAN [phone]       [facility] [address]  [facility] [address]        Equal Access to Services     [facility]: Hadii meghann ku hadasho Soamairani, waaxda luqadaha, qaybta kaalmada adedaleyajose manuel, simran mendez . So [facility] [phone].    ATENCIÓN: Si habla español, tiene a lang disposición servicios gratuitos de asistencia lingüística. [facility] [phone].    We comply with applicable federal civil rights laws and [place] laws. We do not discriminate on the basis of race, color, national origin, age, disability, sex, sexual orientation, or gender identity.            Thank you!     Thank you for choosing [facility] NEUROPSYCHOLOGY  for your care. Our goal is always to provide you with excellent care. Hearing back from our patients is one way we can continue to improve our services. Please take a few minutes to complete the written survey that you may receive in the mail after your visit with us. Thank you!             Your Updated Medication List - Protect others around you: Learn how to safely use, store and throw away your medicines at www.disposemymeds.org.          This list is accurate as of 3/15/18 11:59 PM.  Always use your most recent med list.                   Brand Name Dispense Instructions for use Diagnosis    PREDNISONE PO      Take 10 mg by mouth        triamterene-hydrochlorothiazide 37.5-25 MG per tablet    MAXZIDE-25     Take 1 tablet by mouth daily

## 2018-03-21 NOTE — PROGRESS NOTES
NAME: Megan Leos  MR#: [id number]  YOB: 1980  DATE OF EXAM: 3/15/2018    Neuropsychology Laboratory  [address], [facility] [address]  [place]  [address] [phone]    PSYCHOLOGICAL EVALUATION    RELEVANT HISTORY AND REASON FOR REFERRAL    Megan Leos is a 37 year old  with 13 years of formal education. Information was obtained via interview with the patient and review of her medical records. Ms. Leos has a history of morbid obesity, and is interested in undergoing bariatric surgery. This psychological evaluation was undertaken at the request of Johnna Romero PA-C, as part of the presurgical protocol, to assess personality traits and emotional functioning, as they pertain to her ability to make well reasoned medical decisions and follow through with treatment recommendations.    EVALUATION FINDINGS    Ms. Leos arrived early to her appointment, and was neatly dressed and well groomed. Mood was euthymic. Speech was fluent, with normal articulation, volume, and rate. She presented her thoughts in a clear, logical manner. Memory and attention appeared to be adequate. Judgment and insight appeared to be good.    Upon interview, Ms. Leos stated that she has been considering surgery for about a year. She has family members who underwent Ary-en-Y gastric bypass 10 years ago. One of them developed psychiatric concerns and had problems with dumping syndrome, and the other one did well. She is considering bariatric surgery seriously now since she gained 40 pounds after her third child was born. She has been yo-yo dieting and knows that she needs help to maintain weight loss. She is motivated to make changes. She is interested in the sleeve procedure, she understands that there is less chance for complications. She listed leakage along the staple line, acid reflux, and the possibility of gaining the weight back as risks of the surgery. She feels  prepared to proceed. She has spoken with her family about the surgery, and they are supportive. Her adolescent daughter has told her that she thinks the surgery is unnecessary, but Ms. Leos has explained to her that she is prediabetic and has joint pain, so her daughter is more understanding now. Her  is a stay-at-home father and will be able to assist with her cares as necessary following the procedure.    As noted, Ms. Leos has a long-standing history of morbid obesity. She currently weighs 259 pounds. The most she ever weighed was 276 pounds. She stated that she was 265 pounds at her first presurgical appointment, and was asked to lose 10 pounds from that, so she is close to her goal weight. She first dieted after gaining weight with her first pregnancy 16 years ago. She has tried various diets and weight loss programs including Nutrisystem, Tami Brian, Weight Watchers, and joining gyms. She has a busy, demanding job, and was traveling a lot until a year and a half ago, making it more difficult for her to consistently work out. In the last month, on a typical day, she will have protein shakes for breakfast and lunch. For dinner, she may have homemade chicken noodle soup or chicken tortilla soup. She has not been snacking. Her children and her  have been eating healthier dinners along with her. She was never a soda drinker. She drinks one large cup of coffee a day, and is working on cutting it out in preparation for surgery, by having half caffeinated coffee now. She denied any history of binging or purging. She rejoined [employer] recently, and has an appointment scheduled on Saturday with a  there.    Ms. Leos has a history of postpartum depression after her first child was born. She briefly participated in psychotherapy. She has no other psychiatric history and has never been hospitalized for psychiatric care. She described her mood as good. She does not feel depressed. She  has some anxiety related to her children, but this has not been a major concern. She rated her level of stress as a five on a scale of 1 to 10. Her children and her high-pressure job are her primary stressors. Her  is a stay-at-home dad, however, which helps manage her stress. She has an aracely that helps with breathing, and she uses the massage chairs at the gym and relaxes in the evening to manage stress. She also finds that it helps to exercise. She sleeps well, 7 to 9 hours a night, and she feels rested in the morning. She has not been napping during the day. Her energy level is good, particularly since she started taking vitamins again. Her interest level motivation are good. She denied suicidal ideation or any history of attempts to commit suicide. She denied visual or auditory hallucinations. She has one or two alcoholic beverages once a month. Her score on the CAGE questionnaire was zero. She denied illicit drug use or any history of chemical dependency treatment. She had been smoking half a pack of cigarettes a day until quitting about two years ago. She has never been arrested. She occasionally gambles and denied excessive spending or shopping. She has never been physically or sexually abused.    Ms. Leos completed about a year of college and then left when she had children. She works as an  at [employer], where she has been for 6 years. She has been  for 17 years and has three children between the ages of [age] and 16.    At the age of 12, Ms. Leos was hit by a 2 x 4 on a playground, and sustained a concussion. She was brought to the hospital but did not have to stay overnight. She denied any history of seizure or stroke. Her balance has been good and she has not been falling. She denied unilateral weakness or numbness. She has not had tremor. She experiences occasional sinus headaches. She has lower back pain and sometimes has right knee pain. She has not been to  the emergency department in the last year.    PAST MEDICAL HISTORY: Medical records indicate a history of morbid obesity and joint pain.    CURRENT MEDICATIONS:  Include prednisone, and triamterene-hydrochlorothiazide.    FAMILY MEDICAL HISTORY:  Significant for a grandmother with obesity who  at 65 of colon cancer, and a sister with emotional issues.    On the MMPI-2-RF, a self-report measure of mood and personality, Ms. Leos responded to the items in a consistent and valid manner. Her level of emotional distress was low. She denied significant psychopathology, including depressed mood or ideation, anxiety, or psychosis.    CONCLUSIONS    Megan Leos is a 37-year-old woman with a history morbid obesity who is interested in undergoing bariatric surgery. She appears to be capable comprehending medical information and making well reasons decisions for herself. She has a good understanding of the surgical procedure and the risks involved.    Ms. Leos has a history of postpartum depression after her now 16-year-old child was born, and denied any other history of psychiatric illness. She met briefly with a psychotherapist at the time. Personality assessment falls within normal limits. She does not appear to be experiencing emotional problems that might interfere with her judgment or ability to follow through with treatment recommendations. She denied disordered eating behaviors such as binging or purging. She has been working on making changes in her diet and lifestyle, and has lost most of the 10 pounds required prior to surgery. She is working on cutting out caffeine. She will likely be able to tolerate the stress and physical discomfort of the procedure, as well as any changes in her lifestyle once the surgery is complete. There are no psychosocial contraindications to her undergoing bariatric surgery. These results have been discussed with Ms. Leos.    Laurie Wright, Ph.D., [facility]  Licensed Psychologist, RADHA  [id number]  Board Certified in Clinical Neuropsychology    Time spent:  One hour professional time, including interview (CPT 05588); one hour psychological assessment (CPT 34538).  ICD-10 diagnosis: F54; E66.01.

## 2018-03-22 ENCOUNTER — OFFICE VISIT (OUTPATIENT)
Dept: SURGERY | Facility: CLINIC | Age: 38
End: 2018-03-22
Payer: COMMERCIAL

## 2018-03-22 VITALS
BODY MASS INDEX: 42.77 KG/M2 | OXYGEN SATURATION: 95 % | SYSTOLIC BLOOD PRESSURE: 117 MMHG | DIASTOLIC BLOOD PRESSURE: 78 MMHG | HEIGHT: 65 IN | HEART RATE: 67 BPM | TEMPERATURE: 98.4 F | WEIGHT: 256.7 LBS

## 2018-03-22 DIAGNOSIS — E66.01 MORBID OBESITY DUE TO EXCESS CALORIES (H): Primary | ICD-10-CM

## 2018-03-22 ASSESSMENT — PAIN SCALES - GENERAL: PAINLEVEL: NO PAIN (0)

## 2018-03-22 NOTE — MR AVS SNAPSHOT
After Visit Summary   3/22/2018    Megan Leos    MRN: [id number]           Patient Information     Date Of Birth          1980        Visit Information        Provider Department      3/22/2018 7:20 AM Olman Young MD [facility] Surgical Weight Management        Care Instructions    It was a pleasure meeting with you today.     Thank you for allowing us the privilege of caring for you. We hope we provided you with the excellent service you deserve.     Please let us know if there is anything else we can do for you so that we can be sure you are leaving completely satisfied with your care experience.      You saw Dr Dove today.     Instructions per today's visit:     Call papo for surgery/pac and follow up visit post surgery [phone]      To schedule appointments with our team, please call [phone] option #1    Please call during clinic hours Monday through Friday 8:00a - 4:00p if you have questions or you can contact us via [employer] at anytime.      Nurses: [phone] Option # 3 for nurse advice line.  Fax: [phone]  Surgery Scheduler: [phone]    Please call the hospital at [phone] to speak with our on call MDs if you have urgent needs after hours, during weekends, or holidays.    *For patients who are currently enrolled in our program and have not yet had weight loss surgery please note*:    You must be at your required goal weight at the time of your Anesthesia clinic visit as well as the day of surgery or your surgery will be cancelled. You will not be able to obtain a new surgery date until you have met your goal weight.    Weight loss medications before and after surgery protocol:    Adipex (phentermine): Stop two days before surgery. Do not restart after surgery. May reconsider restarting as needed in the future.    Topimax (topiramate): Can take right up to surgery including morning of surgery and restart post op day #1.    Qsymia  (phentermine/topiramate): Hold morning of surgery. Restart after surgery post op day #1    Contrave (bupropion/naltrxone): Fast taper before surgery. 1 tablet twice daily for 1 week and then discontinue 4 days before surgery.  Will reconsider restarting at postop appt once no longer taking narcotic pain meds.  Will slowly ramp up again.    Naltrexone: Stop 4 days before surgery.  Will reconsider starting again at postop appts when no longer taking narcotic pain meds.    Belviq (locaserin): Stop 2 days before surgery and restart immediately after surgery    Victoza(liraglutide)/Saxenda(liraglutide 3mg)/Trulicity (dulaglutide) (Any GLP-1): Can take up to surgery date and restart immediately after surgery.    Main follow-up parameters for all bariatric patients     Patients who have undergone Bariatric surgery:    1. Follow-up interval during the first year: ~1 week, 1 month, 3 month, 6 month,12 months.  Every 6 months until 5 years; and then annually thereafter.  The goal of follow-up sessions is to ensure that food intake behavior (choice of food and eating speed) and exercise/activity level are set appropriately.    2. Yearly lab tests ordered by our clinic.      3. The bariatric team should be aware and potentially evaluate all adverse gastrointestinal symptoms (dysphagia, abdominal pain, nausea, vomiting, diarrhea, heartburn, reflux, etc), which can be a sign of complication.  The bariatric surgeons perform general surgery procedures in addition to bariatric surgery (laparoscopic cholecystectomy, etc.)    4. Inability to tolerate textured food (chicken, steak, fish, eggs, beans) is NOT normal and may need to be evaluated by endoscopy performed by the bariatric surgeon.                        Follow-ups after your visit        Who to contact     Please call your clinic at [phone] to:    Ask questions about your health    Make or cancel appointments    Discuss your medicines    Learn about your test  "results    Speak to your doctor            Additional Information About Your Visit        [employer] Information     [employer] gives you secure access to your electronic health record. If you see a primary care provider, you can also send messages to your care team and make appointments. If you have questions, please call your primary care clinic.  If you do not have a primary care provider, please call [phone] and they will assist you.      [employer] is an electronic gateway that provides easy, online access to your medical records. With [employer], you can request a clinic appointment, read your test results, renew a prescription or communicate with your care team.     To access your existing account, please contact your [facility] or call [phone] for assistance.        Care EveryWhere ID     This is your Care EveryWhere ID. This could be used by other organizations to access your [facility] medical records  [id number]        Your Vitals Were     Pulse Temperature Height Pulse Oximetry BMI (Body Mass Index)       67 98.4  F (36.9  C) (Oral) 5' 4.57\" 95% 43.29 kg/m2        Blood Pressure from Last 3 Encounters:   03/22/18 117/78   01/10/18 120/73    Weight from Last 3 Encounters:   03/22/18 256 lb 11.2 oz   03/14/18 259 lb 6.4 oz   02/08/18 271 lb 6.4 oz              Today, you had the following     No orders found for display       Primary Care Provider Office Phone # Fax #    BERENICE Fontana, NORMAN [phone]       [place] [address]  [facility] [address]        Equal Access to Services     MONICA AMADOR : Hadii aad ku hadasho Soomaali, waaxda luqadaha, qaybta kaalmada adeegyada, simran jones. So [facility] [phone].    ATENCIÓN: Si habla español, tiene a lang disposición servicios gratuitos de asistencia lingüística. Llame al [phone].    We comply with applicable federal civil rights laws and [place] laws. We do not " discriminate on the basis of race, color, national origin, age, disability, sex, sexual orientation, or gender identity.            Thank you!     Thank you for choosing [facility] SURGICAL WEIGHT MANAGEMENT  for your care. Our goal is always to provide you with excellent care. Hearing back from our patients is one way we can continue to improve our services. Please take a few minutes to complete the written survey that you may receive in the mail after your visit with us. Thank you!             Your Updated Medication List - Protect others around you: Learn how to safely use, store and throw away your medicines at www.disposemymeds.org.          This list is accurate as of 3/22/18  7:49 AM.  Always use your most recent med list.                   Brand Name Dispense Instructions for use Diagnosis    triamterene-hydrochlorothiazide 37.5-25 MG per tablet    MAXZIDE-25     Take 1 tablet by mouth daily

## 2018-03-22 NOTE — PROGRESS NOTES
"Dear Ms. Bansal,      Referring provider: No flowsheet data found.    I was asked to see the patient regarding obesity by the referring provider above.    I had the pleasure of meeting with your patient Megan Leos recently in our weight loss surgery office.  This patient is a 37 year old year old female who has been undergoing our thorough preoperative screening process in anticipation of potential bariatric surgery.    At initial evaluation we recorded Megan Leos's height of 5' 4.567\", weight of 269 lbs 1.6 oz, and calculated Body mass index 45.38 kg/(m^2).  The patient has been unsuccessful with other methods of permanent weight loss and suffers from multiple weight related medical conditions.  Due to lack of success in achieving weight loss through other methods, she is interested in undergoing bariatric surgery.    PREVIOUS WEIGHT LOSS ATTEMPTS:     1/4/2018   I have tried the following methods to lose weight Watching portions or calories, Exercise, Weight Watchers, Tami Brian, Pre packaged meals ex: Nutrisystem, Slimfast, OTC Medications       CO-MORBIDITIES OF OBESITY INCLUDE:     1/4/2018   I have the following co-morbidities associated with obesity: Weight Bearing Joint Pain       VITALS:  /78  Pulse 67  Temp 98.4  F (36.9  C) (Oral)  Ht 5' 4.57\"  Wt 256 lb 11.2 oz  SpO2 95%  BMI 43.29 kg/m2    PE:  GENERAL: Alert and oriented x3. NAD  HEENT exam: Sclerae not icteric. Hearing good. Head normocephalic and atraumatic.   CARDIOVASCULAR: No JVD  RESPIRATORY: Breathing unlabored  GASTROINTESTINAL: Obese  LOWER EXTREMITIES: no deformities  MUSCULOSKELETAL: Normal gait, Moves all 4 extremities equal and strong  NEUROLOGIC: no gross defect  SKIN: warm and dry to touch     In summary, she has undergone an appropriate medical evaluation, dietitian evaluation, as well as psychologic screening. The patient appears to be an appropriate candidate for bariatric surgery.    In the office today, I " discussed the laparoscopic gastric sleeve surgery.  Risks, benefits and anticipated outcomes were outlined including the risk of death, staple line leak (1-2%), PE, DVT, ulcer, worsening GERD, N/V, stricture, hernia, wound infection, weight regain, and vitamin deficiencies. This patient has a good chance of sustaining permanent weight loss due to this procedure.  This should also allow improvement if not resolution of his/her weight related medical conditions.    At present we are going to present your patient's file for prior authorization to insurance.  Pending prior authorization, I anticipate a surgical date in the near future.  We will keep you updated on any progress.  If you have questions regarding care please feel free to contact me.  Total time spent in the clinic was 15 minutes with greater than 50% in face-to-face consultation.        Sincerely,    Olman Young    Please route or send letter to:  Primary Care Provider (PCP) and Referring Provider

## 2018-03-22 NOTE — NURSING NOTE
"Chief Complaint   Patient presents with     Clinic Care Coordination - Follow-up     PBS       Vitals:    03/22/18 0725   BP: 117/78   Pulse: 67   Temp: 98.4  F (36.9  C)   TempSrc: Oral   SpO2: 95%   Weight: 256 lb 11.2 oz   Height: 5' 4.57\"       Body mass index is 43.29 kg/(m^2).      Arianna MALONE LPN                          "

## 2018-03-22 NOTE — NURSING NOTE
This patient is having Sleeve by Dr. Young.    The following handouts were reviewed with the patient :  Before Your Surgery, Patient Checklist, Weight Loss Surgery Pre-operative Class, Preop Recommendations Quick Reference Guide, History and Physical, Medications to Avoid, Shower or Bathing Before Surgery, Bowel Preparation, Powerful Choices and [place] Advance Health Care Directive.  Questions were addressed and understanding of content was verbalized.  Contact information was provided.    Patient goal weight: 259  Weight today: 256    Advised to call Demetrius for surgery/pac/follow up  date,time.

## 2018-03-22 NOTE — LETTER
"3/22/2018       RE: Megan Leos  [address]  [facility] [address]     Dear Colleague,    Thank you for referring your patient, Megan Leos, to the [facility] SURGICAL WEIGHT MANAGEMENT at [facility]. Please see a copy of my visit note below.    Dear Ms. Bansal,      Referring provider: No flowsheet data found.    I was asked to see the patient regarding obesity by the referring provider above.    I had the pleasure of meeting with your patient Megan Leos recently in our weight loss surgery office.  This patient is a 37 year old year old female who has been undergoing our thorough preoperative screening process in anticipation of potential bariatric surgery.    At initial evaluation we recorded Megan Leos's height of 5' 4.567\", weight of 269 lbs 1.6 oz, and calculated Body mass index 45.38 kg/(m^2).  The patient has been unsuccessful with other methods of permanent weight loss and suffers from multiple weight related medical conditions.  Due to lack of success in achieving weight loss through other methods, she is interested in undergoing bariatric surgery.    PREVIOUS WEIGHT LOSS ATTEMPTS:     1/4/2018   I have tried the following methods to lose weight Watching portions or calories, Exercise, Weight Watchers, Tami Brian, Pre packaged meals ex: Nutrisystem, Slimfast, OTC Medications       CO-MORBIDITIES OF OBESITY INCLUDE:     1/4/2018   I have the following co-morbidities associated with obesity: Weight Bearing Joint Pain       VITALS:  /78  Pulse 67  Temp 98.4  F (36.9  C) (Oral)  Ht 5' 4.57\"  Wt 256 lb 11.2 oz  SpO2 95%  BMI 43.29 kg/m2    PE:  GENERAL: Alert and oriented x3. NAD  HEENT exam: Sclerae not icteric. Hearing good. Head normocephalic and atraumatic.   CARDIOVASCULAR: No JVD  RESPIRATORY: Breathing unlabored  GASTROINTESTINAL: Obese  LOWER EXTREMITIES: no deformities  MUSCULOSKELETAL: Normal gait, Moves all 4 extremities equal and " strong  NEUROLOGIC: no gross defect  SKIN: warm and dry to touch     In summary, she has undergone an appropriate medical evaluation, dietitian evaluation, as well as psychologic screening. The patient appears to be an appropriate candidate for bariatric surgery.    In the office today, I discussed the laparoscopic gastric sleeve surgery.  Risks, benefits and anticipated outcomes were outlined including the risk of death, staple line leak (1-2%), PE, DVT, ulcer, worsening GERD, N/V, stricture, hernia, wound infection, weight regain, and vitamin deficiencies. This patient has a good chance of sustaining permanent weight loss due to this procedure.  This should also allow improvement if not resolution of his/her weight related medical conditions.    At present we are going to present your patient's file for prior authorization to insurance.  Pending prior authorization, I anticipate a surgical date in the near future.  We will keep you updated on any progress.  If you have questions regarding care please feel free to contact me.  Total time spent in the clinic was 15 minutes with greater than 50% in face-to-face consultation.      Again, thank you for allowing me to participate in the care of your patient.      Sincerely,    Olman Young MD

## 2018-03-22 NOTE — PATIENT INSTRUCTIONS
It was a pleasure meeting with you today.     Thank you for allowing us the privilege of caring for you. We hope we provided you with the excellent service you deserve.     Please let us know if there is anything else we can do for you so that we can be sure you are leaving completely satisfied with your care experience.      You saw Dr Dove today.     Instructions per today's visit:     Call papo for surgery/pac and follow up visit post surgery [phone]      To schedule appointments with our team, please call [phone] option #1    Please call during clinic hours Monday through Friday 8:00a - 4:00p if you have questions or you can contact us via [facility]apper at anytime.      Nurses: [phone] Option # 3 for nurse advice line.  Fax: [phone]  Surgery Scheduler: [phone]    Please call the hospital at [phone] to speak with our on call MDs if you have urgent needs after hours, during weekends, or holidays.    *For patients who are currently enrolled in our program and have not yet had weight loss surgery please note*:    You must be at your required goal weight at the time of your Anesthesia clinic visit as well as the day of surgery or your surgery will be cancelled. You will not be able to obtain a new surgery date until you have met your goal weight.    Weight loss medications before and after surgery protocol:    Adipex (phentermine): Stop two days before surgery. Do not restart after surgery. May reconsider restarting as needed in the future.    Topimax (topiramate): Can take right up to surgery including morning of surgery and restart post op day #1.    Qsymia (phentermine/topiramate): Hold morning of surgery. Restart after surgery post op day #1    Contrave (bupropion/naltrxone): Fast taper before surgery. 1 tablet twice daily for 1 week and then discontinue 4 days before surgery.  Will reconsider restarting at postop appt once no longer taking narcotic pain meds.  Will slowly ramp up  again.    Naltrexone: Stop 4 days before surgery.  Will reconsider starting again at postop appts when no longer taking narcotic pain meds.    Belviq (locaserin): Stop 2 days before surgery and restart immediately after surgery    Victoza(liraglutide)/Saxenda(liraglutide 3mg)/Trulicity (dulaglutide) (Any GLP-1): Can take up to surgery date and restart immediately after surgery.    Main follow-up parameters for all bariatric patients     Patients who have undergone Bariatric surgery:    1. Follow-up interval during the first year: ~1 week, 1 month, 3 month, 6 month,12 months.  Every 6 months until 5 years; and then annually thereafter.  The goal of follow-up sessions is to ensure that food intake behavior (choice of food and eating speed) and exercise/activity level are set appropriately.    2. Yearly lab tests ordered by our clinic.      3. The bariatric team should be aware and potentially evaluate all adverse gastrointestinal symptoms (dysphagia, abdominal pain, nausea, vomiting, diarrhea, heartburn, reflux, etc), which can be a sign of complication.  The bariatric surgeons perform general surgery procedures in addition to bariatric surgery (laparoscopic cholecystectomy, etc.)    4. Inability to tolerate textured food (chicken, steak, fish, eggs, beans) is NOT normal and may need to be evaluated by endoscopy performed by the bariatric surgeon.

## 2018-04-12 ENCOUNTER — OFFICE VISIT (OUTPATIENT)
Dept: SURGERY | Facility: CLINIC | Age: 38
End: 2018-04-12
Payer: COMMERCIAL

## 2018-04-12 ENCOUNTER — ANESTHESIA EVENT (OUTPATIENT)
Dept: SURGERY | Facility: CLINIC | Age: 38
DRG: 621 | End: 2018-04-12
Payer: COMMERCIAL

## 2018-04-12 ENCOUNTER — ALLIED HEALTH/NURSE VISIT (OUTPATIENT)
Dept: SURGERY | Facility: CLINIC | Age: 38
End: 2018-04-12
Payer: COMMERCIAL

## 2018-04-12 ENCOUNTER — APPOINTMENT (OUTPATIENT)
Dept: SURGERY | Facility: CLINIC | Age: 38
End: 2018-04-12
Payer: COMMERCIAL

## 2018-04-12 VITALS
HEART RATE: 63 BPM | SYSTOLIC BLOOD PRESSURE: 122 MMHG | BODY MASS INDEX: 43.35 KG/M2 | HEIGHT: 65 IN | OXYGEN SATURATION: 97 % | WEIGHT: 260.2 LBS | TEMPERATURE: 98 F | RESPIRATION RATE: 18 BRPM | DIASTOLIC BLOOD PRESSURE: 70 MMHG

## 2018-04-12 DIAGNOSIS — Z98.84 BARIATRIC SURGERY STATUS: ICD-10-CM

## 2018-04-12 DIAGNOSIS — Z01.818 PREOP EXAMINATION: Primary | ICD-10-CM

## 2018-04-12 LAB
ALBUMIN UR-MCNC: NEGATIVE MG/DL
APPEARANCE UR: CLEAR
BILIRUB UR QL STRIP: NEGATIVE
COLOR UR AUTO: YELLOW
GLUCOSE UR STRIP-MCNC: NEGATIVE MG/DL
HGB UR QL STRIP: NEGATIVE
KETONES UR STRIP-MCNC: NEGATIVE MG/DL
LEUKOCYTE ESTERASE UR QL STRIP: ABNORMAL
MUCOUS THREADS #/AREA URNS LPF: PRESENT /LPF
NITRATE UR QL: NEGATIVE
PH UR STRIP: 7 PH (ref 5–7)
RBC #/AREA URNS AUTO: 1 /HPF (ref 0–2)
SOURCE: ABNORMAL
SP GR UR STRIP: 1.02 (ref 1–1.03)
SQUAMOUS #/AREA URNS AUTO: 2 /HPF (ref 0–1)
UROBILINOGEN UR STRIP-MCNC: 0 MG/DL (ref 0–2)
WBC #/AREA URNS AUTO: 2 /HPF (ref 0–5)

## 2018-04-12 ASSESSMENT — LIFESTYLE VARIABLES: TOBACCO_USE: 1

## 2018-04-12 NOTE — ANESTHESIA PREPROCEDURE EVALUATION
"  Anesthesia Evaluation     . Pt has had prior anesthetic. Type: MAC    No history of anesthetic complications          ROS/MED HX    ENT/Pulmonary: Comment: 2.5 pack year smoking history    (+)ROGELIO risk factors obese, other ENT- tinnitus, tobacco use, Past use , . .    Neurologic:     (+)other neuro Menieres    Cardiovascular: Comment: Na 131. Discussed with patient. Her diet is very low sodium due to Menieres. Recheck on DOS. Takes Maxzide for fluid retention, especially hands    (+) ----. : . . . :. . No previous cardiac testing       METS/Exercise Tolerance:  >4 METS   Hematologic:     (+) Other Hematologic Disorder-Sister with \"blood clotting\" during pregnancy. Records state thrombophilia but pt is not sure. Pt has no history and has had no testing.      Musculoskeletal:   (+) , , other musculoskeletal- low back pain      GI/Hepatic:  - neg GI/hepatic ROS       Renal/Genitourinary:  - ROS Renal section negative       Endo:     (+) Obesity, .      Psychiatric:  - neg psychiatric ROS       Infectious Disease:  - neg infectious disease ROS       Malignancy:      - no malignancy   Other:    (+) C-spine cleared: N/A, no H/O Chronic Pain,no other significant disability                    Physical Exam  Normal systems: cardiovascular and pulmonary    Airway   Mallampati: II  TM distance: >3 FB  Neck ROM: full    Dental     Cardiovascular   Rhythm and rate: regular and normal  (-) no murmur    Pulmonary    breath sounds clear to auscultation(-) no wheezes and no rales    Other findings: For further details of assessment, testing, and physical exam please see H and P completed on same date.           PAC Discussion and Assessment    ASA Classification: 2  Case is suitable for: [place]  Anesthetic techniques and relevant risks discussed: GA  Invasive monitoring and risk discussed: No  Types:   Possibility and Risk of blood transfusion discussed: No  NPO instructions given:   Additional anesthetic preparation and risks " discussed:   Needs early admission to pre-op area:   Other:     PAC Resident/NP Anesthesia Assessment:  Megan Leos is a 37 year old female scheduled to undergo lap gastric sleeve on 5/1/18 by Dr. Young. She has the following specific operative considerations:   - RCRI : No serious cardiac risks.    - VTE risk: 1.8%  - ROGELIO # of risks 1/8 = Low risk  - Risk of PONV score = 3.  If > 2, anti-emetic intervention recommended. If 3 or > anti emetic intervention recommended with two or more meds    Only procedure sedation for wisdom teeth.      --Obesity BMI 44. 118 kg Truncal distribution. Able to lay flat. Airway feasible.    --No cardiac history or symptoms. Good exercise tolerance. Maxzide taken for fluid retention. Na 131, possibly attributed to patient's low sodium diet for her Menieres. Counseled her regarding this. Will recheck Na on DOS.    --Menieres with tinnitus. Some hearing loss.    --Former smoker. 2.5 pack years. Denies pulmonary symptoms.    --Reports sister with clotting disorder while pregnant. Records indicate thrombophilia but patient unsure. She denies personal history of problems and has had no testing. Will require standard VTE precautions.     Patient was discussed with Dr Puri.        Reviewed and Signed by PAC Mid-Level Provider/Resident  Mid-Level Provider/Resident: DOYLE Rose, CNS  Date: 4/12/18  Time: 8:15 am    Attending Anesthesiologist Anesthesia Assessment:        Anesthesiologist:   Date:   Time:   Pass/Fail:   Disposition:     PAC Pharmacist Assessment:        Pharmacist:   Date:   Time:      Anesthesia Plan      History & Physical Review      ASA Status:  2 .    NPO Status:  > 8 hours    Plan for General and ETT with Intravenous induction. Maintenance will be Balanced.    PONV prophylaxis:  Ondansetron (or other 5HT-3), Dexamethasone or Solumedrol, Scopolamine patch and Other (See comment) (Propofol on induction. Opioid-sparing multimodal analgesia technique)  Additional  equipment: 2nd IV      Postoperative Care  Postoperative pain management:  IV analgesics, Oral pain medications and Multi-modal analgesia.      Consents  Anesthetic plan, risks, benefits and alternatives discussed with:  Patient and Spouse..                          .

## 2018-04-12 NOTE — H&P
Pre-Operative H & P     CC:  Preoperative exam to assess for increased cardiopulmonary risk while undergoing surgery and anesthesia.    Date of Encounter: 4/12/2018  Primary Care Physician:  Farideh Bansal PA  Reason for visit: Morbid obesity due to excess calories (H) [E66.01]  - Primary   HPI  Megan Leos is a 37 year old female who presents for pre-operative H & P in preparation for lap gastric sleeve with Dr. Young on 5/1/18 at [facility]. History is obtained from the patient.     Patient who recently completed bariatric surgery evaluation in consideration for weight loss surgery. She has had issues with her weight since her pregnancies and has tried multiple weight loss diets and plans without lasting success. Consult with Dr. Young with above procedure now planned.    Her history is otherwise significant for Menieres.  Past Medical History  Past Medical History:   Diagnosis Date     Hearing problem 2008    Menieres     Meniere disease      Morbid obesity (H) 1/17/2018     Tinnitus      Vision disorder 1989    Astigmatism       Past Surgical History  Past Surgical History:   Procedure Laterality Date     [place] teeth  age 27       Hx of Blood transfusions/reactions: Denies.      Hx of abnormal bleeding or anti-platelet use: Denies.     Menstrual history: Patient's last menstrual period was 04/06/2018 (exact date).    Steroid use in the last year: Denies.     Personal or FH with difficulty with Anesthesia:  Denies.     Prior to Admission Medications  Current Outpatient Prescriptions   Medication Sig Dispense Refill     Multiple Vitamin (MULTI VITAMIN DAILY PO) Take by mouth every morning       Ascorbic Acid (VITAMIN C PO) Take 1,500 mg by mouth every morning       VITAMIN D, CHOLECALCIFEROL, PO Take 1,000 Units by mouth every morning       triamterene-hydrochlorothiazide (MAXZIDE-25) 37.5-25 MG per tablet Take 1 tablet by mouth every morning           Allergies  Allergies   Allergen Reactions     Penicillins Hives and Itching     Latex Rash       Social History  Social History     Social History     Marital status:      Spouse name: N/A     Number of children: N/A     Years of education: N/A     Occupational History     Not on file.     Social History Main Topics     Smoking status: Former Smoker     Packs/day: 0.50     Years: 5.00     Types: Cigarettes     Start date: 2011     Quit date: 10/27/2016     Smokeless tobacco: Never Used     Alcohol use Yes      Comment: Recreational, only drink 2-3 alcoholic beverages a week     Drug use: No     Sexual activity: Yes     Partners: Male     Birth control/ protection: Male Surgical     Other Topics Concern     Not on file     Social History Narrative       Family History  Family History   Problem Relation Age of Onset     DIABETES Maternal Grandmother      Colon Cancer Maternal Grandmother       from complications with colon cancer in      Obesity Maternal Grandmother      Prostate Cancer Maternal Grandfather      cured after 1yr of treatments     Thrombophilia Sister      Thrombophilia Sister        Review of Systems  The complete review of systems is negative other than noted in the HPI or here.   ROS/MED HX    ENT/Pulmonary: Comment: 2.5 pack year smoking history    (+)tobacco use, Past use , . .    Neurologic:  - Menieres   Cardiovascular:     (+) ----. : . . . :. . No previous cardiac testing       ETS/Exercise Tolerance:  >4 METS   Hematologic:  - neg hematologic  ROS    Sister had clotting during pregnancy. Records indicate thrombophilia but patient not sure. No issues or testing for patient.   Musculoskeletal: back pain     GI/Hepatic:  - neg GI/hepatic ROS       Renal/Genitourinary:  - ROS Renal section negative       Endo:     (+) Obesity, .      Psychiatric:  - neg psychiatric ROS       Infectious Disease:  - neg infectious disease ROS       Malignancy:      - no malignancy   Other:   "  (+) C-spine cleared: N/A, no H/O Chronic Pain,no other significant disability        Physical Exam      Airway   Mallampati: II  TM distance: >3 FB  Neck ROM: full    Temp: 98  F (36.7  C) Temp src: Oral BP: 122/70 Pulse: 63   Resp: 18 SpO2: 97 %         260 lbs 3.2 oz  5' 4.57\"   Body mass index is 43.88 kg/(m^2).       Physical Exam  Constitutional: Awake, alert, cooperative, no apparent distress, and appears stated age.  Eyes: Pupils equal, round and reactive to light, extra ocular muscles intact, sclera clear, conjunctiva normal. Glasses on.  HENT: Normocephalic, oral pharynx with moist mucus membranes, good dentition. No goiter appreciated.   Respiratory: Clear to auscultation bilaterally, no crackles or wheezing. No cough or obvious dyspnea.  Cardiovascular: Regular rate and rhythm, normal S1 and S2, and no murmur noted.  Carotids +2, no bruits. No edema. Palpable pulses to radial  DP and PT arteries.   GI: Normal bowel sounds, soft, non-distended, non-tender, no masses palpated, no hepatosplenomegaly.    Lymph/Hematologic: No cervical lymphadenopathy and no supraclavicular lymphadenopathy.  Genitourinary: Deferred.   Skin: Warm and dry.  No rashes at anticipated surgical site.   Musculoskeletal: Full ROM of neck. There is no redness, warmth, or swelling of the joints. Gross motor strength is normal.    Neurologic: Awake, alert, oriented to name, place and time. Cranial nerves II-XII are grossly intact. Gait is normal.   Neuropsychiatric: Calm, cooperative. Normal affect.     Labs: (personally reviewed)  Lab Results   Component Value Date    WBC 13.9 01/10/2018     Lab Results   Component Value Date    RBC 4.71 01/10/2018     Lab Results   Component Value Date    HGB 13.9 01/10/2018     Lab Results   Component Value Date    HCT 40.7 01/10/2018     Lab Results   Component Value Date    MCV 86 01/10/2018     Lab Results   Component Value Date    MCH 29.5 01/10/2018     Lab Results   Component Value Date    " MCHC 34.2 01/10/2018     Lab Results   Component Value Date    RDW 12.4 01/10/2018     Lab Results   Component Value Date     01/10/2018     Last Basic Metabolic Panel:  Lab Results   Component Value Date     01/10/2018      Lab Results   Component Value Date    POTASSIUM 4.0 01/10/2018     Lab Results   Component Value Date    CHLORIDE 96 01/10/2018     Lab Results   Component Value Date    VICTOR M 8.7 01/10/2018     Lab Results   Component Value Date    CO2 28 01/10/2018     Lab Results   Component Value Date    BUN 16 01/10/2018     Lab Results   Component Value Date    CR 0.72 01/10/2018     Lab Results   Component Value Date     01/10/2018     Lab Results   Component Value Date    AST 32 01/10/2018     Lab Results   Component Value Date    ALT 62 01/10/2018     No results found for: BILICONJ   Lab Results   Component Value Date    BILITOTAL 0.4 01/10/2018     Lab Results   Component Value Date    ALBUMIN 3.6 01/10/2018     Lab Results   Component Value Date    PROTTOTAL 8.3 01/10/2018      Lab Results   Component Value Date    ALKPHOS 82 01/10/2018     EKG: Not indicated.   Outside records reviewed from: [facility] Everywhere    ASSESSMENT and PLAN  Megan Leos is a 37 year old female scheduled to undergo lap gastric sleeve on 5/1/18 by Dr. Young. She has the following specific operative considerations:   - RCRI : No serious cardiac risks.    - Anesthesia considerations:  Refer to PAC assessment in anesthesia records  - VTE risk: 1.8%  - ROGELIO # of risks 1/8 = Low risk  - Risk of PONV score = 3.  If > 2, anti-emetic intervention recommended. If 3 or > anti emetic intervention recommended with two or more meds     --Obesity BMI 44. 118 kg Truncal distribution. Able to lay flat. Airway feasible.    --No cardiac history or symptoms. Good exercise tolerance. Maxzide taken for fluid retention. Na 131, possibly attributed to patient's low sodium diet for her Menieres. Counseled her regarding this. Will  recheck Na on DOS.    --Menieres with tinnitus. Some hearing loss.    --Former smoker. 2.5 pack years. Denies pulmonary symptoms.    --Reports sister with clotting disorder while pregnant. Records indicate thrombophilia but patient unsure. She denies personal history of problems and has had no testing. Will require standard VTE precautions.   Arrival time, NPO, shower and medication instructions provided by nursing staff today. Preparing For Your Surgery handout given.  Patient was discussed with Dr Puri.    DOYLE De Leon CNS  Preoperative Assessment Center  [facility]  Clinic and Surgery Center  Phone: [phone]  Fax: [phone]

## 2018-04-12 NOTE — PATIENT INSTRUCTIONS
Preparing for Your Surgery      Name:  Megan Leos   MRN:  [id number]   :  1980   Today's Date:  2018     Arriving for surgery:  Surgery date:   Arrival time:  8:40 am  Please come to:       [facility] [address]  [address] [place]  [address]    -   parking is available in front of the hospital from 5:15 am to 8:00 pm    -  Stop at the Information Desk in the lobby    -   Inform the information person that you are here for surgery. An escort to 3c will be provided. If you would not like an escort, please proceed to 3C on the 3rd floor. [phone]     What can I eat or drink?  -  You may have solid food or milk products until 8 hours prior to your surgery.  -  You may have water, apple juice or 7up/Sprite until 2 hours prior to your surgery.    Which medicines can I take?  -  Please take these medications the day of surgery:  NONE    How do I prepare myself?  -  Take two showers: one the night before surgery; and one the morning of surgery.         Use Scrubcare or Hibiclens to wash from neck down.  You may use your own shampoo and conditioner. No other hair products.   -  Do NOT use lotion, powder, deodorant, or antiperspirant the day of your surgery.  -  Do NOT wear any makeup, fingernail polish or jewelry.  -Do not bring your own medications to the hospital, except for inhalers and eye drops.  -  Bring your ID and insurance card.    Questions or Concerns:  If you have questions or concerns regarding the day of surgery, please call the Preoperative Assessment Center (PAC), Monday-Friday 7AM-7PM:  [phone].  After surgery please call your surgeons office.

## 2018-05-01 ENCOUNTER — HOSPITAL ENCOUNTER (INPATIENT)
Facility: CLINIC | Age: 38
LOS: 1 days | Discharge: HOME | DRG: 621 | End: 2018-05-02
Attending: SURGERY | Admitting: SURGERY
Payer: COMMERCIAL

## 2018-05-01 ENCOUNTER — ANESTHESIA (OUTPATIENT)
Dept: SURGERY | Facility: CLINIC | Age: 38
DRG: 621 | End: 2018-05-01
Payer: COMMERCIAL

## 2018-05-01 DIAGNOSIS — Z98.84 S/P LAPAROSCOPIC SLEEVE GASTRECTOMY: Primary | ICD-10-CM

## 2018-05-01 DIAGNOSIS — Z98.84 BARIATRIC SURGERY STATUS: ICD-10-CM

## 2018-05-01 LAB
CREAT SERPL-MCNC: 0.64 MG/DL (ref 0.52–1.04)
CREAT SERPL-MCNC: 0.73 MG/DL (ref 0.52–1.04)
GFR SERPL CREATININE-BSD FRML MDRD: 90 ML/MIN/1.7M2
GFR SERPL CREATININE-BSD FRML MDRD: >90 ML/MIN/1.7M2
GLUCOSE BLDC GLUCOMTR-MCNC: 81 MG/DL (ref 70–99)
HCG UR QL: NEGATIVE
HGB BLD-MCNC: 12.9 G/DL (ref 11.7–15.7)
PLATELET # BLD AUTO: 244 10E9/L (ref 150–450)
POTASSIUM SERPL-SCNC: 3.9 MMOL/L (ref 3.4–5.3)
SODIUM SERPL-SCNC: 138 MMOL/L (ref 133–144)

## 2018-05-01 PROCEDURE — 27210794 ZZH OR GENERAL SUPPLY STERILE: Performed by: SURGERY

## 2018-05-01 PROCEDURE — 00000146 ZZHCL STATISTIC GLUCOSE BY METER IP

## 2018-05-01 PROCEDURE — 25000125 ZZHC RX 250: Performed by: NURSE ANESTHETIST, CERTIFIED REGISTERED

## 2018-05-01 PROCEDURE — 25000125 ZZHC RX 250: Performed by: SURGERY

## 2018-05-01 PROCEDURE — 71000012 ZZH RECOVERY PHASE 1 LEVEL 1 FIRST HR: Performed by: SURGERY

## 2018-05-01 PROCEDURE — 71000013 ZZH RECOVERY PHASE 1 LEVEL 1 EA ADDTL HR: Performed by: SURGERY

## 2018-05-01 PROCEDURE — 25000128 H RX IP 250 OP 636: Performed by: STUDENT IN AN ORGANIZED HEALTH CARE EDUCATION/TRAINING PROGRAM

## 2018-05-01 PROCEDURE — 37000009 ZZH ANESTHESIA TECHNICAL FEE, EACH ADDTL 15 MIN: Performed by: SURGERY

## 2018-05-01 PROCEDURE — C9399 UNCLASSIFIED DRUGS OR BIOLOG: HCPCS | Performed by: NURSE ANESTHETIST, CERTIFIED REGISTERED

## 2018-05-01 PROCEDURE — 25000128 H RX IP 250 OP 636: Performed by: NURSE ANESTHETIST, CERTIFIED REGISTERED

## 2018-05-01 PROCEDURE — 25000125 ZZHC RX 250: Performed by: PHYSICIAN ASSISTANT

## 2018-05-01 PROCEDURE — 88305 TISSUE EXAM BY PATHOLOGIST: CPT | Performed by: SURGERY

## 2018-05-01 PROCEDURE — 25000128 H RX IP 250 OP 636: Performed by: PHYSICIAN ASSISTANT

## 2018-05-01 PROCEDURE — 37000008 ZZH ANESTHESIA TECHNICAL FEE, 1ST 30 MIN: Performed by: SURGERY

## 2018-05-01 PROCEDURE — 36416 COLLJ CAPILLARY BLOOD SPEC: CPT | Performed by: SURGERY

## 2018-05-01 PROCEDURE — 36000062 ZZH SURGERY LEVEL 4 1ST 30 MIN - UMMC: Performed by: SURGERY

## 2018-05-01 PROCEDURE — 84295 ASSAY OF SERUM SODIUM: CPT | Performed by: ANESTHESIOLOGY

## 2018-05-01 PROCEDURE — 40000170 ZZH STATISTIC PRE-PROCEDURE ASSESSMENT II: Performed by: SURGERY

## 2018-05-01 PROCEDURE — 82565 ASSAY OF CREATININE: CPT | Performed by: ANESTHESIOLOGY

## 2018-05-01 PROCEDURE — 25000564 ZZH DESFLURANE, EA 15 MIN: Performed by: SURGERY

## 2018-05-01 PROCEDURE — 40000556 ZZH STATISTIC PERIPHERAL IV START W US GUIDANCE

## 2018-05-01 PROCEDURE — 0DB64Z3 EXCISION OF STOMACH, PERCUTANEOUS ENDOSCOPIC APPROACH, VERTICAL: ICD-10-PCS | Performed by: SURGERY

## 2018-05-01 PROCEDURE — 25000125 ZZHC RX 250: Performed by: ANESTHESIOLOGY

## 2018-05-01 PROCEDURE — 25000128 H RX IP 250 OP 636: Performed by: ANESTHESIOLOGY

## 2018-05-01 PROCEDURE — 82565 ASSAY OF CREATININE: CPT | Performed by: SURGERY

## 2018-05-01 PROCEDURE — 84132 ASSAY OF SERUM POTASSIUM: CPT | Performed by: ANESTHESIOLOGY

## 2018-05-01 PROCEDURE — 85018 HEMOGLOBIN: CPT | Performed by: ANESTHESIOLOGY

## 2018-05-01 PROCEDURE — 85049 AUTOMATED PLATELET COUNT: CPT | Performed by: SURGERY

## 2018-05-01 PROCEDURE — 27210995 ZZH RX 272: Performed by: SURGERY

## 2018-05-01 PROCEDURE — 81025 URINE PREGNANCY TEST: CPT | Performed by: ANESTHESIOLOGY

## 2018-05-01 PROCEDURE — 36000064 ZZH SURGERY LEVEL 4 EA 15 ADDTL MIN - UMMC: Performed by: SURGERY

## 2018-05-01 PROCEDURE — 12000008 ZZH R&B INTERMEDIATE UMMC

## 2018-05-01 PROCEDURE — 25000132 ZZH RX MED GY IP 250 OP 250 PS 637: Performed by: STUDENT IN AN ORGANIZED HEALTH CARE EDUCATION/TRAINING PROGRAM

## 2018-05-01 PROCEDURE — 25000132 ZZH RX MED GY IP 250 OP 250 PS 637: Performed by: ANESTHESIOLOGY

## 2018-05-01 RX ORDER — SIMETHICONE 40MG/0.6ML
40 SUSPENSION, DROPS(FINAL DOSAGE FORM)(ML) ORAL EVERY 6 HOURS PRN
Status: DISCONTINUED | OUTPATIENT
Start: 2018-05-01 | End: 2018-05-01 | Stop reason: HOSPADM

## 2018-05-01 RX ORDER — TRIAMTERENE/HYDROCHLOROTHIAZID 37.5-25 MG
1 TABLET ORAL DAILY
Status: DISCONTINUED | OUTPATIENT
Start: 2018-05-01 | End: 2018-05-02 | Stop reason: HOSPADM

## 2018-05-01 RX ORDER — LIDOCAINE HYDROCHLORIDE 20 MG/ML
INJECTION, SOLUTION INFILTRATION; PERINEURAL PRN
Status: DISCONTINUED | OUTPATIENT
Start: 2018-05-01 | End: 2018-05-01

## 2018-05-01 RX ORDER — FENTANYL CITRATE 50 UG/ML
INJECTION, SOLUTION INTRAMUSCULAR; INTRAVENOUS PRN
Status: DISCONTINUED | OUTPATIENT
Start: 2018-05-01 | End: 2018-05-01

## 2018-05-01 RX ORDER — PROPOFOL 10 MG/ML
INJECTION, EMULSION INTRAVENOUS PRN
Status: DISCONTINUED | OUTPATIENT
Start: 2018-05-01 | End: 2018-05-01

## 2018-05-01 RX ORDER — GABAPENTIN 300 MG/1
300 CAPSULE ORAL ONCE
Status: COMPLETED | OUTPATIENT
Start: 2018-05-01 | End: 2018-05-01

## 2018-05-01 RX ORDER — BUPIVACAINE HYDROCHLORIDE 2.5 MG/ML
INJECTION, SOLUTION INFILTRATION; PERINEURAL PRN
Status: DISCONTINUED | OUTPATIENT
Start: 2018-05-01 | End: 2018-05-01 | Stop reason: HOSPADM

## 2018-05-01 RX ORDER — ACETAMINOPHEN 325 MG/1
650 TABLET ORAL EVERY 4 HOURS PRN
Status: DISCONTINUED | OUTPATIENT
Start: 2018-05-04 | End: 2018-05-02 | Stop reason: HOSPADM

## 2018-05-01 RX ORDER — DEXAMETHASONE SODIUM PHOSPHATE 4 MG/ML
INJECTION, SOLUTION INTRA-ARTICULAR; INTRALESIONAL; INTRAMUSCULAR; INTRAVENOUS; SOFT TISSUE PRN
Status: DISCONTINUED | OUTPATIENT
Start: 2018-05-01 | End: 2018-05-01

## 2018-05-01 RX ORDER — CLINDAMYCIN PHOSPHATE 900 MG/50ML
900 INJECTION, SOLUTION INTRAVENOUS SEE ADMIN INSTRUCTIONS
Status: DISCONTINUED | OUTPATIENT
Start: 2018-05-01 | End: 2018-05-01 | Stop reason: HOSPADM

## 2018-05-01 RX ORDER — SIMETHICONE 80 MG
80 TABLET,CHEWABLE ORAL EVERY 6 HOURS PRN
Status: DISCONTINUED | OUTPATIENT
Start: 2018-05-01 | End: 2018-05-01 | Stop reason: HOSPADM

## 2018-05-01 RX ORDER — NALOXONE HYDROCHLORIDE 0.4 MG/ML
.1-.4 INJECTION, SOLUTION INTRAMUSCULAR; INTRAVENOUS; SUBCUTANEOUS
Status: DISCONTINUED | OUTPATIENT
Start: 2018-05-01 | End: 2018-05-02 | Stop reason: HOSPADM

## 2018-05-01 RX ORDER — ACETAMINOPHEN 500 MG
1000 TABLET ORAL ONCE
Status: COMPLETED | OUTPATIENT
Start: 2018-05-01 | End: 2018-05-01

## 2018-05-01 RX ORDER — LIDOCAINE 40 MG/G
CREAM TOPICAL
Status: DISCONTINUED | OUTPATIENT
Start: 2018-05-01 | End: 2018-05-02 | Stop reason: HOSPADM

## 2018-05-01 RX ORDER — HYDRALAZINE HYDROCHLORIDE 20 MG/ML
2.5-5 INJECTION INTRAMUSCULAR; INTRAVENOUS EVERY 10 MIN PRN
Status: DISCONTINUED | OUTPATIENT
Start: 2018-05-01 | End: 2018-05-01 | Stop reason: HOSPADM

## 2018-05-01 RX ORDER — FENTANYL CITRATE 50 UG/ML
25-50 INJECTION, SOLUTION INTRAMUSCULAR; INTRAVENOUS EVERY 5 MIN PRN
Status: DISCONTINUED | OUTPATIENT
Start: 2018-05-01 | End: 2018-05-01 | Stop reason: HOSPADM

## 2018-05-01 RX ORDER — SCOLOPAMINE TRANSDERMAL SYSTEM 1 MG/1
1 PATCH, EXTENDED RELEASE TRANSDERMAL ONCE
Status: COMPLETED | OUTPATIENT
Start: 2018-05-01 | End: 2018-05-01

## 2018-05-01 RX ORDER — CLINDAMYCIN PHOSPHATE 900 MG/50ML
900 INJECTION, SOLUTION INTRAVENOUS
Status: DISCONTINUED | OUTPATIENT
Start: 2018-05-01 | End: 2018-05-01 | Stop reason: HOSPADM

## 2018-05-01 RX ORDER — SODIUM CHLORIDE, SODIUM LACTATE, POTASSIUM CHLORIDE, CALCIUM CHLORIDE 600; 310; 30; 20 MG/100ML; MG/100ML; MG/100ML; MG/100ML
INJECTION, SOLUTION INTRAVENOUS CONTINUOUS
Status: DISCONTINUED | OUTPATIENT
Start: 2018-05-01 | End: 2018-05-01 | Stop reason: HOSPADM

## 2018-05-01 RX ORDER — ONDANSETRON 2 MG/ML
4 INJECTION INTRAMUSCULAR; INTRAVENOUS EVERY 30 MIN PRN
Status: DISCONTINUED | OUTPATIENT
Start: 2018-05-01 | End: 2018-05-01 | Stop reason: HOSPADM

## 2018-05-01 RX ORDER — HYDROMORPHONE HYDROCHLORIDE 1 MG/ML
.3-.5 INJECTION, SOLUTION INTRAMUSCULAR; INTRAVENOUS; SUBCUTANEOUS
Status: DISCONTINUED | OUTPATIENT
Start: 2018-05-01 | End: 2018-05-01 | Stop reason: HOSPADM

## 2018-05-01 RX ORDER — ONDANSETRON 4 MG/1
4 TABLET, ORALLY DISINTEGRATING ORAL EVERY 30 MIN PRN
Status: DISCONTINUED | OUTPATIENT
Start: 2018-05-01 | End: 2018-05-01 | Stop reason: HOSPADM

## 2018-05-01 RX ORDER — LABETALOL HYDROCHLORIDE 5 MG/ML
5-10 INJECTION, SOLUTION INTRAVENOUS EVERY 10 MIN PRN
Status: DISCONTINUED | OUTPATIENT
Start: 2018-05-01 | End: 2018-05-01 | Stop reason: HOSPADM

## 2018-05-01 RX ORDER — OXYCODONE HYDROCHLORIDE 5 MG/1
5-10 TABLET ORAL
Status: DISCONTINUED | OUTPATIENT
Start: 2018-05-01 | End: 2018-05-02 | Stop reason: HOSPADM

## 2018-05-01 RX ORDER — SODIUM CHLORIDE, SODIUM LACTATE, POTASSIUM CHLORIDE, CALCIUM CHLORIDE 600; 310; 30; 20 MG/100ML; MG/100ML; MG/100ML; MG/100ML
INJECTION, SOLUTION INTRAVENOUS CONTINUOUS
Status: DISCONTINUED | OUTPATIENT
Start: 2018-05-01 | End: 2018-05-02 | Stop reason: HOSPADM

## 2018-05-01 RX ORDER — ONDANSETRON 2 MG/ML
4 INJECTION INTRAMUSCULAR; INTRAVENOUS EVERY 6 HOURS PRN
Status: DISCONTINUED | OUTPATIENT
Start: 2018-05-01 | End: 2018-05-02 | Stop reason: HOSPADM

## 2018-05-01 RX ORDER — ONDANSETRON 2 MG/ML
INJECTION INTRAMUSCULAR; INTRAVENOUS PRN
Status: DISCONTINUED | OUTPATIENT
Start: 2018-05-01 | End: 2018-05-01

## 2018-05-01 RX ADMIN — OXYCODONE HYDROCHLORIDE 5 MG: 5 TABLET ORAL at 20:58

## 2018-05-01 RX ADMIN — GABAPENTIN 300 MG: 300 CAPSULE ORAL at 12:08

## 2018-05-01 RX ADMIN — ACETAMINOPHEN 1000 MG: 500 TABLET, FILM COATED ORAL at 12:08

## 2018-05-01 RX ADMIN — CLINDAMYCIN PHOSPHATE 900 MG: 18 INJECTION, SOLUTION INTRAVENOUS at 12:40

## 2018-05-01 RX ADMIN — ONDANSETRON 4 MG: 2 INJECTION INTRAMUSCULAR; INTRAVENOUS at 12:56

## 2018-05-01 RX ADMIN — ONDANSETRON 4 MG: 2 INJECTION INTRAMUSCULAR; INTRAVENOUS at 20:58

## 2018-05-01 RX ADMIN — DEXAMETHASONE SODIUM PHOSPHATE 6 MG: 4 INJECTION, SOLUTION INTRA-ARTICULAR; INTRALESIONAL; INTRAMUSCULAR; INTRAVENOUS; SOFT TISSUE at 12:56

## 2018-05-01 RX ADMIN — SIMETHICONE 80 MG: 80 TABLET, CHEWABLE ORAL at 14:59

## 2018-05-01 RX ADMIN — MIDAZOLAM 2 MG: 1 INJECTION INTRAMUSCULAR; INTRAVENOUS at 12:20

## 2018-05-01 RX ADMIN — FENTANYL CITRATE 250 MCG: 50 INJECTION, SOLUTION INTRAMUSCULAR; INTRAVENOUS at 12:32

## 2018-05-01 RX ADMIN — HYDROMORPHONE HYDROCHLORIDE 0.4 MG: 1 INJECTION, SOLUTION INTRAMUSCULAR; INTRAVENOUS; SUBCUTANEOUS at 15:16

## 2018-05-01 RX ADMIN — HYDROMORPHONE HYDROCHLORIDE 0.3 MG: 1 INJECTION, SOLUTION INTRAMUSCULAR; INTRAVENOUS; SUBCUTANEOUS at 14:03

## 2018-05-01 RX ADMIN — SUGAMMADEX 200 MG: 100 INJECTION, SOLUTION INTRAVENOUS at 13:20

## 2018-05-01 RX ADMIN — PROCHLORPERAZINE EDISYLATE 5 MG: 5 INJECTION INTRAMUSCULAR; INTRAVENOUS at 14:54

## 2018-05-01 RX ADMIN — PROPOFOL 200 MG: 10 INJECTION, EMULSION INTRAVENOUS at 12:32

## 2018-05-01 RX ADMIN — ONDANSETRON 4 MG: 2 INJECTION INTRAMUSCULAR; INTRAVENOUS at 13:47

## 2018-05-01 RX ADMIN — ENOXAPARIN SODIUM 40 MG: 40 INJECTION SUBCUTANEOUS at 11:19

## 2018-05-01 RX ADMIN — SODIUM CHLORIDE, POTASSIUM CHLORIDE, SODIUM LACTATE AND CALCIUM CHLORIDE: 600; 310; 30; 20 INJECTION, SOLUTION INTRAVENOUS at 12:20

## 2018-05-01 RX ADMIN — FENTANYL CITRATE 50 MCG: 50 INJECTION INTRAMUSCULAR; INTRAVENOUS at 13:55

## 2018-05-01 RX ADMIN — TRIAMTERENE AND HYDROCHLOROTHIAZIDE 1 TABLET: 37.5; 25 TABLET ORAL at 20:58

## 2018-05-01 RX ADMIN — FENTANYL CITRATE 50 MCG: 50 INJECTION INTRAMUSCULAR; INTRAVENOUS at 14:14

## 2018-05-01 RX ADMIN — ROCURONIUM BROMIDE 50 MG: 10 INJECTION INTRAVENOUS at 12:32

## 2018-05-01 RX ADMIN — SCOPALAMINE 1 PATCH: 1 PATCH, EXTENDED RELEASE TRANSDERMAL at 12:08

## 2018-05-01 RX ADMIN — LIDOCAINE HYDROCHLORIDE 100 MG: 20 INJECTION, SOLUTION INFILTRATION; PERINEURAL at 12:32

## 2018-05-01 ASSESSMENT — PAIN DESCRIPTION - DESCRIPTORS
DESCRIPTORS: CRAMPING
DESCRIPTORS: BURNING;CRAMPING
DESCRIPTORS: CRAMPING
DESCRIPTORS: CRAMPING

## 2018-05-01 NOTE — ANESTHESIA POSTPROCEDURE EVALUATION
Patient: Megan Leos    Procedure(s):  Laparoscopic Sleeve Gastrectomy  Latex Allergy  - Wound Class: II-Clean Contaminated    Diagnosis:Obesity Due To Excess Calories   Diagnosis Additional Information: No value filed.    Anesthesia Type:  General, ETT    Note:  Anesthesia Post Evaluation    Patient location during evaluation: PACU  Patient participation: Able to fully participate in evaluation  Level of consciousness: awake and alert  Pain management: satisfactory to patient  Airway patency: patent  Cardiovascular status: acceptable and stable  Respiratory status: acceptable and spontaneous ventilation  Hydration status: euvolemic  PONV: controlled     Anesthetic complications: None          Last vitals:  Vitals:    05/01/18 1052   BP: 117/63   Pulse: 62   Resp: 18   Temp: 37.1  C (98.7  F)   SpO2: 99%         Electronically Signed By: Malcom Vásquez MD  May 1, 2018  1:36 PM

## 2018-05-01 NOTE — ANESTHESIA CARE TRANSFER NOTE
Patient: Megan Leos    Procedure(s):  Laparoscopic Sleeve Gastrectomy  Latex Allergy  - Wound Class: II-Clean Contaminated    Diagnosis: Obesity Due To Excess Calories   Diagnosis Additional Information: No value filed.    Anesthesia Type:   General, ETT     Note:  Airway :Face Mask  Patient transferred to:PACU  Comments: Patient oxygenating and ventilating well on 6LFM.  Patient KING, following commands, and denies pain.  Report given to RN and VSS. Handoff Report: Identifed the Patient, Identified the Reponsible Provider, Reviewed the pertinent medical history, Discussed the surgical course, Reviewed Intra-OP anesthesia mangement and issues during anesthesia, Set expectations for post-procedure period and Allowed opportunity for questions and acknowledgement of understanding      Vitals: (Last set prior to Anesthesia Care Transfer)    CRNA VITALS  5/1/2018 1259 - 5/1/2018 1335      5/1/2018             NIBP: 157/81    Ht Rate: 80    Temp: 36.6  C (97.9  F)    SpO2: 100 %    Resp Rate (observed): 16    EKG: Sinus rhythm                Electronically Signed By: DOYLE Santiago CRNA  May 1, 2018  1:35 PM

## 2018-05-01 NOTE — IP AVS SNAPSHOT
MRN:[id number]                      After Visit Summary   5/1/2018    Megan Leos    MRN: [id number]           Thank you!     Thank you for choosing [facility] for your care. Our goal is always to provide you with excellent care. Hearing back from our patients is one way we can continue to improve our services. Please take a few minutes to complete the written survey that you may receive in the mail after you visit with us. Thank you!        Patient Information     Date Of Birth          1980        About your hospital stay     You were admitted on:  May 1, 2018 You last received care in the:  [address] [facility]    You were discharged on:  May 2, 2018        Reason for your hospital stay       S/p sleeve gastrectomy                  Who to Call     For medical emergencies, please call 911.  For non-urgent questions about your medical care, please call your primary care provider or clinic, [phone]  For questions related to your surgery, please call your surgery clinic        Attending Provider     Provider Specialty    Olman Young MD Surgery       Primary Care Provider Office Phone # Fax #    BERENICE Fontana PA-C [phone]      After Care Instructions     Discharge Instructions       Diet on discharge : post-op diet is bariatric  full liquids   No lifting >10 pounds for 4-6 weeks. Discuss with your surgeon at follow up appointment  May shower starting postoperative day #1 but no scrubbing incisions. No bathing or soaking incisions for 2 weeks or until incisions completely healed.  Wound care: Keep clean and dry. Steri strips or glue will fall off on their own.   After surgery it is ok to swallow medications smaller than 1/4 inch(size of pencil eraser) for all procedures.  If medication is larger than 1/4 inch then it will need to be crushed, cut or in liquid form for 1-2 months after surgery. This can be discussed with surgeon team at the 1 month follow  up appointment and will depend on patient tolerance.  Follow-up with your surgeon team in 1-2 weeks. If this appointment was not previous made for you please call [phone] and choose option #1 to schedule your follow-up appointment.   You will receive a call from our clinic nurse after surgery.  If you have any questions and would like to speak with a nurse please call [phone] and choose option #3 to speak with a triage nurse.  Call [phone] and ask to speak with surgery resident if you are having troubles in the evenings, at night, or on weekends. Please call if you experience increasing abdominal pain, nausea, vomiting, increasing drainage from your wounds, chills, or fever >101.5  Take stool softener while taking narcotic pain medication.  No driving for at least 12 hours after taking narcotic pain medication.  Appointments located at [facility] clinic:  [address] [place] [address] [address], [place] [address]                  Follow-up Appointments     Adult [facility] Follow-up and recommended labs and tests       Follow-up with Dr. Young in 1 week    Appointments on [place] and/or [facility] (with [facility] or [facility] provider or service). Call [phone] if you haven't heard regarding these appointments within 7 days of discharge.                  Your next 10 appointments already scheduled     May 10, 2018  9:40 AM CDT   (Arrive by 9:25 AM)   RETURN BARIATRIC SURGERY with Olman Young MD   [facility] ([facility])    [address] [place] [address]   [phone]            May 10, 2018 10:00 AM CDT   (Arrive by 9:45 AM)   NUTRITION VISIT with Salome Aguilar RD   [facility] ([facility])    [address] [place] [address]   [phone]            Jun 07, 2018  2:15 PM CDT   (Arrive by 2:00 PM)   RETURN BARIATRIC SURGERY with  "Johnna Romero PA-C   [facility] ([facility])    [address] [facility] [address] [phone]            Jun 07, 2018  2:30 PM CDT   (Arrive by 2:15 PM)   NUTRITION VISIT with Salome Aguilar RD   [facility] ([facility])    [address]  [address] [facility] [address]   [phone]              Future tests that were ordered for you     Sodium                 Additional Information     If you use hormonal birth control (such as the pill, patch, ring or implants): You'll need a second form of birth control for 7 days (condoms, a diaphragm or contraceptive foam). While in the hospital, you received a medicine called Bridion. Your normal birth control will not work as well for a week after taking this medicine.          Pending Results     Date and Time Order Name Status Description    5/1/2018 1305 Surgical pathology exam In process             Statement of Approval     Ordered          05/02/18 1206  I have reviewed and agree with all the recommendations and orders detailed in this document.  EFFECTIVE NOW     Approved and electronically signed by:  Candice Hale MD           05/02/18 1148  I have reviewed and agree with all the recommendations and orders detailed in this document.  EFFECTIVE NOW     Approved and electronically signed by:  Candice Hale MD             Admission Information     Date & Time Provider Department Dept. Phone    5/1/2018 Olman Young MD [address] [facility] [place] [phone]      Your Vitals Were     Blood Pressure Pulse Temperature Respirations Height Weight    139/78 (BP Location: Left arm) 76 98.2  F (36.8  C) (Oral) 20 1.626 m (5' 4\") 119.8 kg (264 lb 3.2 oz)    Last Period Pulse Oximetry BMI (Body Mass Index)             04/06/2018 (Exact Date) 98% 45.35 kg/m2         MyChart Information     " [employer] gives you secure access to your electronic health record. If you see a primary care provider, you can also send messages to your care team and make appointments. If you have questions, please call your primary care clinic.  If you do not have a primary care provider, please call [phone] and they will assist you.        Care EveryWhere ID     This is your Care EveryWhere ID. This could be used by other organizations to access your [facility] medical records  [id number]        Equal Access to Services     MONICA AMADOR : Hadii aad ku hadasho Soomaali, waaxda luqadaha, qaybta kaalmada adeegyada, waxay joséin hayfrancois celisdeanadennis jones. So [facility] [phone].    ATENCIÓN: Si habla español, tiene a lang disposición servicios gratuitos de asistencia lingüística. Ll[facility] [phone].    We comply with applicable federal civil rights laws and [place] laws. We do not discriminate on the basis of race, color, national origin, age, disability, sex, sexual orientation, or gender identity.               Review of your medicines      START taking        Dose / Directions    oxyCODONE IR 5 MG tablet   Commonly known as:  ROXICODONE        Dose:  5-10 mg   Take 1-2 tablets (5-10 mg) by mouth every 6 hours as needed for other (pain control or improvement in physical function. Hold dose for analgesic side effects.)   Quantity:  20 tablet   Refills:  0         CONTINUE these medicines which have NOT CHANGED        Dose / Directions    triamterene-hydrochlorothiazide 37.5-25 MG per tablet   Commonly known as:  MAXZIDE-25        Dose:  1 tablet   Take 1 tablet by mouth every morning   Refills:  0         STOP taking     MULTI VITAMIN DAILY PO           VITAMIN C PO           VITAMIN D (CHOLECALCIFEROL) PO                Where to get your medicines      Some of these will need a paper prescription and others can be bought over the counter. Ask your nurse if you have questions.     Bring a paper prescription for each of  these medications     oxyCODONE IR 5 MG tablet                Protect others around you: Learn how to safely use, store and throw away your medicines at www.disposemymeds.org.        Information about OPIOIDS     PRESCRIPTION OPIOIDS: WHAT YOU NEED TO KNOW   You have a prescription for an opioid (narcotic) pain medicine. Opioids can cause addiction. If you have a history of chemical dependency of any type, you are at a higher risk of becoming addicted to opioids. Only take this medicine after all other options have been tried. Take it for as short a time and as few doses as possible.     Do not:    Drive. If you drive while taking these medicines, you could be arrested for driving under the influence (DUI).    Operate heavy machinery    Do any other dangerous activities while taking these medicines.     Drink any alcohol while taking these medicines.      Take with any other medicines that contain acetaminophen. Read all labels carefully. Look for the word  acetaminophen  or  Tylenol.  Ask your pharmacist if you have questions or are unsure.    Store your pills in a secure place, locked if possible. We will not replace any lost or stolen medicine. If you don t finish your medicine, please throw away (dispose) as directed by your pharmacist. The [place] Pollution [employer] has more information about safe disposal: https://www.pca.[facility].[place].us/living-green/managing-unwanted-medications    All opioids tend to cause constipation. Drink plenty of water and eat foods that have a lot of fiber, such as fruits, vegetables, prune juice, apple juice and high-fiber cereal. Take a laxative (Miralax, milk of magnesia, Colace, Senna) if you don t move your bowels at least every other day.              Medication List: This is a list of all your medications and when to take them. Check marks below indicate your daily home schedule. Keep this list as a reference.      Medications           Morning Afternoon Evening Bedtime As  Needed    oxyCODONE IR 5 MG tablet   Commonly known as:  ROXICODONE   Take 1-2 tablets (5-10 mg) by mouth every 6 hours as needed for other (pain control or improvement in physical function. Hold dose for analgesic side effects.)   Last time this was given:  5 mg on 5/2/2018 11:52 AM                                triamterene-hydrochlorothiazide 37.5-25 MG per tablet   Commonly known as:  MAXZIDE-25   Take 1 tablet by mouth every morning   Last time this was given:  1 tablet on 5/2/2018  7:59 AM

## 2018-05-01 NOTE — IP AVS SNAPSHOT
[address] [address]    Phone:  [phone]                                       After Visit Summary   5/1/2018    Megan Leos    MRN: [id number]           After Visit Summary Signature Page     I have received my discharge instructions, and my questions have been answered. I have discussed any challenges I see with this plan with the nurse or doctor.    ..........................................................................................................................................  Patient/Patient Representative Signature      ..........................................................................................................................................  Patient Representative Print Name and Relationship to Patient    ..................................................               ................................................  Date                                            Time    ..........................................................................................................................................  Reviewed by Signature/Title    ...................................................              ..............................................  Date                                                            Time

## 2018-05-01 NOTE — OP NOTE
[facility], [place]    Operative Note    Pre-operative diagnosis: Obesity Due To Excess Calories    Post-operative diagnosis * No post-op diagnosis entered *   Procedure: Procedure(s):  Laparoscopic Sleeve Gastrectomy  Latex Allergy  - Wound Class: II-Clean Contaminated   Surgeon: Surgeon(s) and Role:     * Olman Young MD - Primary     * Candice Hale MD - Resident - Assisting   Anesthesia: General    Estimated blood loss: 10 cc   Drains: None   Specimens:   ID Type Source Tests Collected by Time Destination   A : partial gastrectomy Tissue Stomach, Body SURGICAL PATHOLOGY EXAM Olman Young MD 2018  1:05 PM       Findings: No hiatal hernia.   Complications: None.   Implants: None.         BOUGIE SIZE: 40 FR  DISTANCE FROM PYLORUS: 10 CM  STAPLE LINE REINFORCEMENT: NO  STAPLE LINE OVERSEW: NO  COMORBIDITIES:   Past Medical History:   Diagnosis Date     Hearing problem     [place]     Meniere disease      Morbid obesity (H) 2018     Tinnitus      Vision disorder 1989    Astigmatism       INDICATIONS FOR PROCEDURE  Megan Leos is a 37 year old female who is morbidly obese.  Numerous weight loss attempts without surgery have been without success.     After understanding the risks and benefits of proceeding with a laparoscopic vertical sleeve gastrectomy, she agreed to an operation as outlined by me.    I reviewed the risks of surgery with Megan Leos.    These include, but are not limited to, death, myocardial infarction, pneumonia, urinary tract infection, deep venous thrombosis with or without pulmonary embolus, abdominal infection from bowel injury or abscess, bowel obstruction, wound infection, and bleeding.    More specific risks related to vertical sleeve gastrectomy were detailed at the bariatric informational seminar and include the followin.) leak at the vertical sleeve staple line, 2.) stricture in the sleeve, 3.)  "nausea, vomiting, and dehydration for several months, 4.) adhesions causing bowel obstruction, 5.) rapid weight loss causing a higher rate of gallstone formation during the first 6 months after surgery, 6.) decreased absorption of vitamins because of the reduced stomach size, 7.) weight regain if inappropriate food intake occurs.    The BMI that we are treating this patient for was measured at the initial consultation visit in our bariatric program and it was: 45 kg/m2 (as calculated just below).      The initial consult height, weight, and BMI are as follows:    Height: 5' 4\"   BARIATRIC WEIGHT TRACKING 1/10/2018   Initial Weight 269 lbs 2 oz       Our weight loss surgery program requires weight loss prior to bariatric surgery and currently the height, weight, and BMI are as follows:    Height: 162.6 cm (5' 4\"), Weight: 116 kg (255 lb 11.7 oz), and currently the Body mass index is 43.9 kg/(m^2).    Due to the patient's comorbidity condition of weight bearing joint pain, in association with elevated body mass index, bariatric surgery has been recommended and is being performed today.    Moreover, as the surgeon performing this procedure, I certify that the following are true in regards to this patient at or prior to the day of surgery:    1. The patient's body mass index (BMI) is or has been greater than or equal to 35 kg/m2.  2. The patient has at least one co-morbidity related to obesity (as outlined above).  3. The patient has been previously unsuccessful with medical treatment for obesity.  Please note that some of this information has been documented as part of a comprehensive pre-bariatric surgery process in the outpatient clinic and is NOT immediately available in the inpatient encounter for this bariatric operation.      OPERATIVE PROCEDURE:     Megan Leos was brought to the operating room and prepared in routine fashion. Under the benefits of general anesthesia, a left upper quadrant Veress " needle was inserted and pneumoperitoneum was established using carbon dioxide gas to a maximum pressure of 15 mmHg. A total of five ports were placed into the abdomen.     A liver retractor was placed through the rightmost port and this provided a view of the upper stomach. The operation was started by dividing the short gastric vessels off the greater curvature of the stomach. This dissection was carried up to the angle of His, and the ligasure dissector was used for hemostasis.     A bougie (size noted above) was passed into the stomach and I used 5 purple loads  of the Endo PRIMITIVO stapler device to create a vertical sleeve gastrectomy with the bougie as a template. The bougie was removed.    The sleeve gastrectomy specimen (partial gastrectomy) was now removed from the abdomen through the 15 mm port.     Hemostasis was secured, and the liver retractor and all ports were removed from the abdomen under direct visualization.     All needle and sponge counts were correct x2 at the end of the operation, and I was present for all critical components of the procedure.     Skin incisions were closed using skin staples, and sterile dressings were placed.     Olman Young MD  Surgery  [phone] (hospital )  [phone] (clinic nurses)

## 2018-05-02 VITALS
TEMPERATURE: 98.2 F | RESPIRATION RATE: 20 BRPM | HEIGHT: 64 IN | DIASTOLIC BLOOD PRESSURE: 78 MMHG | OXYGEN SATURATION: 98 % | SYSTOLIC BLOOD PRESSURE: 139 MMHG | HEART RATE: 76 BPM | WEIGHT: 264.2 LBS | BODY MASS INDEX: 45.11 KG/M2

## 2018-05-02 LAB
GLUCOSE BLDC GLUCOMTR-MCNC: 118 MG/DL (ref 70–99)
GLUCOSE BLDC GLUCOMTR-MCNC: 136 MG/DL (ref 70–99)
GLUCOSE BLDC GLUCOMTR-MCNC: 98 MG/DL (ref 70–99)

## 2018-05-02 PROCEDURE — 00000146 ZZHCL STATISTIC GLUCOSE BY METER IP

## 2018-05-02 PROCEDURE — 25000128 H RX IP 250 OP 636: Performed by: STUDENT IN AN ORGANIZED HEALTH CARE EDUCATION/TRAINING PROGRAM

## 2018-05-02 PROCEDURE — 25000132 ZZH RX MED GY IP 250 OP 250 PS 637: Performed by: STUDENT IN AN ORGANIZED HEALTH CARE EDUCATION/TRAINING PROGRAM

## 2018-05-02 RX ORDER — OXYCODONE HYDROCHLORIDE 5 MG/1
5-10 TABLET ORAL EVERY 6 HOURS PRN
Qty: 20 TABLET | Refills: 0 | Status: SHIPPED | OUTPATIENT
Start: 2018-05-02 | End: 2018-05-10

## 2018-05-02 RX ADMIN — OXYCODONE HYDROCHLORIDE 5 MG: 5 TABLET ORAL at 08:00

## 2018-05-02 RX ADMIN — SODIUM CHLORIDE, POTASSIUM CHLORIDE, SODIUM LACTATE AND CALCIUM CHLORIDE: 600; 310; 30; 20 INJECTION, SOLUTION INTRAVENOUS at 09:01

## 2018-05-02 RX ADMIN — TRIAMTERENE AND HYDROCHLOROTHIAZIDE 1 TABLET: 37.5; 25 TABLET ORAL at 07:59

## 2018-05-02 RX ADMIN — OXYCODONE HYDROCHLORIDE 10 MG: 5 TABLET ORAL at 02:05

## 2018-05-02 RX ADMIN — OXYCODONE HYDROCHLORIDE 5 MG: 5 TABLET ORAL at 11:52

## 2018-05-02 RX ADMIN — ENOXAPARIN SODIUM 40 MG: 40 INJECTION SUBCUTANEOUS at 09:32

## 2018-05-02 NOTE — PLAN OF CARE
Problem: Patient Care Overview  Goal: Individualization & Mutuality  Outcome: No Change  Vitals:    05/01/18 2100 05/01/18 2200 05/01/18 2236 05/02/18 0410   BP: 135/65 117/52 125/57 117/47   BP Location:  Left arm Left arm Left arm   Pulse:       Resp: 18 18 18 21   Temp:  97.6  F (36.4  C) 99.1  F (37.3  C) 98.2  F (36.8  C)   TempSrc:  Oral Oral Oral   SpO2: 95% 96% 94% 95%   Weight: 119.8 kg (264 lb 3.2 oz)      Height:         Per 12 hours, max temp 99.1. Encouraged deep breathing and coughing and IS. Recheck temp 98.2. Otherwise VSS. O2 sats high 90s on 1 L/min NC. LS diminished in bases. On capnography, IPI 9-10. One lap site on left side underneath breast has scant dried drainage and is marked, no new drainage. Otherwise lap sites c/d/i. Hypoactive BS, denies passing flatus, no BM, patient is belching. C/o nausea, notified Dr. Yariel Brand who ordered PRN Zofran, patient denied further nausea, patient is also wearing scopalamine patch behind right ear. PRN oxycodone effective for pain control. Tolerating bariatric clears 30cc/30 min, MIVF infusing per orders. Up with SBA, ambulated in halls x3. Voiding adequate amounts of concentrated clear des urine. Slept in between cares. Continue POC.

## 2018-05-02 NOTE — PLAN OF CARE
Problem: Patient Care Overview  Goal: Plan of Care/Patient Progress Review  Vitals:    05/01/18 2200 05/01/18 2236 05/02/18 0410 05/02/18 0733   BP: 117/52 125/57 117/47 119/77   BP Location: Left arm Left arm Left arm Left arm   Pulse:       Resp: 18 18 21 21   Temp: 97.6  F (36.4  C) 99.1  F (37.3  C) 98.2  F (36.8  C) 98.2  F (36.8  C)   TempSrc: Oral Oral Oral Oral   SpO2: 96% 94% 95% 97%   Weight:       Height:         Patient s/p Lap Sleeve Gastrectomy. ao x4 verbalizes incision pain at 7/10 on pain scale. Pain controlled with prn oxycodone with some relief. Patient able to move around with minimal discomfort.  5 lap sites staples removed this morning and sites are covered with steri strips and are cdi without drainage. LR infusing via right piv at 75 ml/hr. Blood glu 98. Voids adequately cl yellow urine. No bm, has started to pas some gas. bs hypo.  Son by bedside. Patient to d/c home.

## 2018-05-02 NOTE — PLAN OF CARE
Problem: Patient Care Overview  Goal: Plan of Care/Patient Progress Review  Outcome: Improving  POD # 0 lap sleeve gastrectomy, VSS, tolerating 1L NC. Arrived on unit around 1600. Denies pain/nausea. Has Scop patch behind right ear and sea band. Tolerating ice chips and offered water. Pt would like to trying sitting up on the side of bed tonight. Haven't voided yet, please encourage to walk to bathroom to void. Lap incisions CDI. LR infusing at 75cc/hr. Continue with cares and update MD with any changes.

## 2018-05-02 NOTE — DISCHARGE SUMMARY
MIS/ BARIATRIC SURGERY DISCHARGE SUMMARY       NAME: Megan Leos   MRN: [id number]   : 1980     DATE OF ADMISSION: 2018     PRE/POSTOPERATIVE DIAGNOSES: Morbid obesity    PROCEDURES PERFORMED: Laparoscopic sleeve gastrectomy     PATHOLOGY RESULTS: pending    CULTURE RESULTS: None     INTRAOPERATIVE COMPLICATIONS: None     POSTOPERATIVE COMPLICATIONS: None     DRAINS/TUBES PRESENT AT DISCHARGE: None    DATE OF DISCHARGE:      HOSPITAL COURSE: Megan Leos is a 37 year old female who on 2018  underwent the above-named procedures.  She tolerated the operation well and postoperatively was transferred to the general post-surgical unit.  The remainder of her course was essentially uncomplicated.  Prior to discharge, her pain was controlled well, she was able to perform ADLs and ambulate independently without difficulty, and had full return of  bladder function. She was tolerating full liquids on day of discharge. On , she was discharged to home.    DISCHARGE EXAM:   A&O, NAD  Resp non-labored  Distal extremities warm  Incisions C/D. Staples removed and steristrips placed. Abd soft, appropriately tender, non distended     DISCHARGE INSTRUCTIONS:    Discharge Procedure Orders  Sodium     Reason for your hospital stay   Order Comments: S/p sleeve gastrectomy     Adult [facility] Follow-up and recommended labs and tests   Order Comments: Follow-up with Dr. Young in 1 week    Appointments on [place] and/or [facility] (with [facility] or [facility] provider or service). Call [phone] if you haven't heard regarding these appointments within 7 days of discharge.     Discharge Instructions   Order Comments: Diet on discharge : post-op diet is bariatric  full liquids   No lifting >10 pounds for 4-6 weeks. Discuss with your surgeon at follow up appointment  May shower starting postoperative day #1 but no scrubbing incisions. No bathing or soaking incisions for 2 weeks or until incisions completely  healed.  Wound care: Keep clean and dry. Steri strips or glue will fall off on their own.   After surgery it is ok to swallow medications smaller than 1/4 inch(size of pencil eraser) for all procedures.  If medication is larger than 1/4 inch then it will need to be crushed, cut or in liquid form for 1-2 months after surgery. This can be discussed with surgeon team at the 1 month follow up appointment and will depend on patient tolerance.  Follow-up with your surgeon team in 1-2 weeks. If this appointment was not previous made for you please call [phone] and choose option #1 to schedule your follow-up appointment.   You will receive a call from our clinic nurse after surgery.  If you have any questions and would like to speak with a nurse please call [phone] and choose option #3 to speak with a triage nurse.  Call [phone] and ask to speak with surgery resident if you are having troubles in the evenings, at night, or on weekends. Please call if you experience increasing abdominal pain, nausea, vomiting, increasing drainage from your wounds, chills, or fever >101.5  Take stool softener while taking narcotic pain medication.  No driving for at least 12 hours after taking narcotic pain medication.  Appointments located at [facility] clinic:  [address] [facility] [address]  [place] [address]     Full Code         DISCHARGE MEDICATIONS:   Current Discharge Medication List      START taking these medications    Details   oxyCODONE IR (ROXICODONE) 5 MG tablet Take 1-2 tablets (5-10 mg) by mouth every 6 hours as needed for other (pain control or improvement in physical function. Hold dose for analgesic side effects.)  Qty: 20 tablet, Refills: 0    Associated Diagnoses: S/P laparoscopic sleeve gastrectomy         CONTINUE these medications which have NOT CHANGED    Details   triamterene-hydrochlorothiazide (MAXZIDE-25) 37.5-25 MG per tablet Take 1 tablet by mouth every morning          STOP taking these  medications       Ascorbic Acid (VITAMIN C PO) Comments:   Reason for Stopping:         Multiple Vitamin (MULTI VITAMIN DAILY PO) Comments:   Reason for Stopping:         VITAMIN D, CHOLECALCIFEROL, PO Comments:   Reason for Stopping:

## 2018-05-03 ENCOUNTER — CARE COORDINATION (OUTPATIENT)
Dept: SURGERY | Facility: CLINIC | Age: 38
End: 2018-05-03

## 2018-05-03 ENCOUNTER — CARE COORDINATION (OUTPATIENT)
Dept: CARE COORDINATION | Facility: CLINIC | Age: 38
End: 2018-05-03

## 2018-05-03 LAB — COPATH REPORT: NORMAL

## 2018-05-03 NOTE — PROGRESS NOTES
Bariatric Surgery Post op Call      72 Hour Post-Op Follow Up:     Ms. Megan Leos is a 37 year old female who underwent sleeve on 5/3 with Dr. Young for a history of obesity.  Spoke with Patient.    Support  Patient able to care for self independently    Coughing/Deep Breathing:yes  Pain ratin - not taking pain medications    (If patient needs additional pain medication and Tylenol is not contraindicated, then ok  advise patient to take ES Tylenol 2 tablets every 6 hours while symptoms last, not to exceed 6 caplets in 24 hours).     Leg swelling: no  Nausea/Vomiting: no  Passing flatus: yes  Having BM s: no   Activity Level: walking  Fluid Intake: 50 oz  Concerns: no.     Taking any weight loss medications prior to surgery? no      Activity/Restrictions  Patient following lifting restrictions.    Activity/Restrictions  Patient following lifting restrictions. and Following restrictions outlined by surgeon.     Whom and When to Call  Patient acknowledges understanding of how to manage any medication changes and when to seek medical care.      Patient advised that if after hour medical concerns arise to please call [phone] and choose option 4 to speak to the physician on call.      Confirm with patient their follow-up appointment date and time? yes     Time spent with patient: 10 min

## 2018-05-07 ENCOUNTER — TELEPHONE (OUTPATIENT)
Dept: SURGERY | Facility: CLINIC | Age: 38
End: 2018-05-07

## 2018-05-07 NOTE — TELEPHONE ENCOUNTER
Received message from the call center.  Called and spoke to patient.     Miss Leos who had a sleeve gastrectomy on May 1st, 2018. Calling in stating last night she had about a quarter cup of soup over 30 minutes which immediately made her have a bowel movement and then she became clammy , very hot ,flushed and sweaty and states her heartbeat increased 104 bpm. She states she immediately feel better after going to the bathroom. She s no longer taking her pain medication. She does state that although she feels better today she still has a dull ache on her back shoulder blades. She also experiences a dull ache on the right side of her rib cage, and states it does hurt to take a deep breath. She rechecked her pulse today and it was 70 to 84 bpm. She has had a protein shake and cream of mushroom soup today and doesn t have any symptoms with that. No nausea no vomiting she s voiding normally. She states her temperature is running between 98 to 99 .    Message sent to Dr. Young requesting recommendations. Patient is in no acute distress currently. She is feeling much better today.    Per Dr. Young, nothing further is needed at this time. If patient develops shortness of breath, she should call our office during business hours, or she should present to the ED with urgent symptoms.

## 2018-05-07 NOTE — TELEPHONE ENCOUNTER
[facility] Call Center    Phone Message    May a detailed message be left on voicemail: yes    Reason for Call: Symptoms or Concerns     If patient has red-flag symptoms, warm transfer to triage line    Current symptom or concern: Pt ate soup yesterday - while eating, felt ill, got hot, flushed, sweaty, clammy, increased heart rate-104 beats per minute, had to run to the bathroom. She feels better today but has a dull ache on her back shoulder blades.     Symptoms have been present for:  1  day(s)    Has patient previously been seen for this? Yes    By Olman Young:     Date: 5.7.18    Are there any new or worsening symptoms? No      Action Taken: Message routed to:  [facility] ([facility] osei bariatric

## 2018-05-10 ENCOUNTER — ALLIED HEALTH/NURSE VISIT (OUTPATIENT)
Dept: SURGERY | Facility: CLINIC | Age: 38
End: 2018-05-10
Payer: COMMERCIAL

## 2018-05-10 ENCOUNTER — OFFICE VISIT (OUTPATIENT)
Dept: SURGERY | Facility: CLINIC | Age: 38
End: 2018-05-10
Payer: COMMERCIAL

## 2018-05-10 VITALS
DIASTOLIC BLOOD PRESSURE: 75 MMHG | WEIGHT: 239.2 LBS | BODY MASS INDEX: 39.85 KG/M2 | SYSTOLIC BLOOD PRESSURE: 118 MMHG | HEART RATE: 79 BPM | OXYGEN SATURATION: 96 % | HEIGHT: 65 IN | TEMPERATURE: 98.2 F

## 2018-05-10 DIAGNOSIS — K31.83 ACHLORHYDRIA, GASTRIC: Primary | ICD-10-CM

## 2018-05-10 RX ORDER — URSODIOL 300 MG/1
300 CAPSULE ORAL 2 TIMES DAILY
Qty: 180 CAPSULE | Refills: 1 | Status: SHIPPED | OUTPATIENT
Start: 2018-05-10

## 2018-05-10 ASSESSMENT — PAIN SCALES - GENERAL: PAINLEVEL: NO PAIN (0)

## 2018-05-10 NOTE — MR AVS SNAPSHOT
MRN:[id number]                      After Visit Summary   5/10/2018    Megan Leos    MRN: [id number]           Visit Information        Provider Department      5/10/2018 10:00 AM Salome Aguilar RD M [facility]        Your next 10 appointments already scheduled     Jun 07, 2018  2:15 PM CDT   (Arrive by 2:00 PM)   RETURN BARIATRIC SURGERY with NORMAN Samaniego [facility] ([facility])    [address] [place] [address]   [phone]            Jun 07, 2018  2:30 PM CDT   (Arrive by 2:15 PM)   NUTRITION VISIT with DANY Gramajo [facility] ([facility])    [address] [place] [address]   [phone]              Care Instructions    Goals:  1) Follow bariatric low-fat full liquid diet through day 13 post-op, then to progress to pureed diet x 2 weeks.  If tolerating, may advance on day 29 post-op to bariatric soft diet.   2) Work towards 60 gm protein/day.  3) Consume 48-64 oz fluids daily- between meals.  4) Eat slowly (>20 min/meal), chewing well to smooth consistency once on the bariatric soft diet.  5) Limit portions to 1/2 cup/meal.  6) Start chewy multivitamin/minerals twice daily along with chewy iron from [employer].         [employer] Information     [employer] gives you secure access to your electronic health record. If you see a primary care provider, you can also send messages to your care team and make appointments. If you have questions, please call your primary care clinic.  If you do not have a primary care provider, please call [phone] and they will assist you.      [employer] is an electronic gateway that provides easy, online access to your medical records. With [employer] you can request a clinic appointment, read your test results, renew a prescription or  communicate with your care team.     To access your existing account, please contact your [facility] or call [phone] for assistance.        Care EveryWhere ID     This is your Care EveryWhere ID. This could be used by other organizations to access your [place] medical records  [id number]        Equal Access to Services     MONICA AMADOR : Danis Serra, wanieves parker, rigoberto hopkinsalnancy farnsworth, simran jones. So [facility] [phone].    ATENCIÓN: Si habla español, tiene a lang disposición servicios gratuitos de asistencia lingüística. Llame al [phone].    We comply with applicable federal civil rights laws and [place] laws. We do not discriminate on the basis of race, color, national origin, age, disability, sex, sexual orientation, or gender identity.

## 2018-05-10 NOTE — MR AVS SNAPSHOT
After Visit Summary   5/10/2018    Megan Leos    MRN: [id number]           Patient Information     Date Of Birth          1980        Visit Information        Provider Department      5/10/2018 9:40 AM Olman Young MD [facility]        Today's Diagnoses     Achlorhydria, gastric    -  1       Follow-ups after your visit        Follow-up notes from your care team     Return in about 4 weeks (around 6/7/2018).      Your next 10 appointments already scheduled     Jun 07, 2018  2:15 PM CDT   (Arrive by 2:00 PM)   RETURN BARIATRIC SURGERY with NORMAN Samaniego [facility] ([facility])    [address] [place] [address] [phone]            Jun 07, 2018  2:30 PM CDT   (Arrive by 2:15 PM)   NUTRITION VISIT with Salome Aguilar RD   [facility] ([facility])    [address] [place] [address] [phone]              Who to contact     Please call your clinic at [phone] to:    Ask questions about your health    Make or cancel appointments    Discuss your medicines    Learn about your test results    Speak to your doctor            Additional Information About Your Visit        [employer] Information     [employer] gives you secure access to your electronic health record. If you see a primary care provider, you can also send messages to your care team and make appointments. If you have questions, please call your primary care clinic.  If you do not have a primary care provider, please call [phone] and they will assist you.      [employer] is an electronic gateway that provides easy, online access to your medical records. With [employer], you can request a clinic appointment, read your test results, renew a prescription or communicate with your care team.     To access your existing  "account, please contact your [facility] or call [phone] for assistance.        Care EveryWhere ID     This is your Care EveryWhere ID. This could be used by other organizations to access your [place] medical records  [id number]        Your Vitals Were     Pulse Temperature Height Pulse Oximetry BMI (Body Mass Index)       79 98.2  F (36.8  C) (Oral) 5' 4.57\" 96% 40.34 kg/m2        Blood Pressure from Last 3 Encounters:   05/10/18 118/75   05/02/18 139/78   04/12/18 122/70    Weight from Last 3 Encounters:   05/10/18 239 lb 3.2 oz   05/01/18 264 lb 3.2 oz   04/12/18 260 lb 3.2 oz              Today, you had the following     No orders found for display         Today's Medication Changes          These changes are accurate as of 5/10/18 10:10 AM.  If you have any questions, ask your nurse or doctor.               Start taking these medicines.        Dose/Directions    ursodiol 300 MG capsule   Commonly known as:  ACTIGALL   Used for:  Achlorhydria, gastric   Started by:  Olman Young MD        Dose:  300 mg   Take 1 capsule (300 mg) by mouth 2 times daily   Quantity:  180 capsule   Refills:  1         Stop taking these medicines if you haven't already. Please contact your care team if you have questions.     oxyCODONE IR 5 MG tablet   Commonly known as:  ROXICODONE   Stopped by:  Olman Young MD                Where to get your medicines      These medications were sent to [facility]/pharmacy #[id number] [address] [address]     Phone:  [phone]     ursodiol 300 MG capsule                Primary Care Provider Office Phone # Fax #    BERENICE Fontana PA-C [phone]       [facility] [address]  [facility] [address]        Equal Access to Services     MONICA AMADOR AH: Hadii meghann bauman hadasho Soomaali, waaxda luqadaha, qaybta kaalmada javad, simran ruiz " manuela mendez ah. So [facility] [phone].    ATENCIÓN: Si habla mariam, tiene a lang disposición servicios gratuitos de asistencia lingüística. Sharan al [phone].    We comply with applicable federal civil rights laws and [place] laws. We do not discriminate on the basis of race, color, national origin, age, disability, sex, sexual orientation, or gender identity.            Thank you!     Thank you for choosing [facility] SURGICAL WEIGHT MANAGEMENT  for your care. Our goal is always to provide you with excellent care. Hearing back from our patients is one way we can continue to improve our services. Please take a few minutes to complete the written survey that you may receive in the mail after your visit with us. Thank you!             Your Updated Medication List - Protect others around you: Learn how to safely use, store and throw away your medicines at www.disposemymeds.org.          This list is accurate as of 5/10/18 10:10 AM.  Always use your most recent med list.                   Brand Name Dispense Instructions for use Diagnosis    triamterene-hydrochlorothiazide 37.5-25 MG per tablet    MAXZIDE-25     Take 1 tablet by mouth every morning        ursodiol 300 MG capsule    ACTIGALL    180 capsule    Take 1 capsule (300 mg) by mouth 2 times daily    Achlorhydria, gastric

## 2018-05-10 NOTE — PROGRESS NOTES
"Postoperative bariatric surgery visit.    Patient underwent sleeve gastrectomy 1 week ago.      Tolerating liquids: yes  Lightheadedness: a small amount; ??triamterene  Abdominal pain: some pain RUQ with deep breaths; transient.  Bowel movements: regular now.  Fevers/shakes/chills: no    /75  Pulse 79  Temp 98.2  F (36.8  C) (Oral)  Ht 5' 4.57\"  Wt 239 lb 3.2 oz  SpO2 96%  BMI 40.34 kg/m2  NAD  Overall looks great  Incisions c/d/i    Plan:  1. RD visit today.  2. Start vitamin supplements per RD directions.  3. Advance diet per RD directions.  4. Follow-up: 3-4 weeks.  5. Ursodiol prescribed for 6 months if patient wants to use to prevent gallstones.      Olman Young MD  Surgery  [phone] (hospital )  [phone] (clinic nurses)                "

## 2018-05-10 NOTE — NURSING NOTE
"(   Chief Complaint   Patient presents with     Surgical Followup     1 wk post op LSG 5/1/18    )    ( Weight: 239 lb 3.2 oz )  ( Height: 5' 4.57\" )  ( BMI (Calculated): 40.42 )  ( Initial Weight: 269 lb 1.6 oz )  ( Cumulative weight loss (lbs): 29.9 )  ( Last Visits Weight: 256 lb 11.2 oz )  ( Wt change since last visit (lbs): -17.5 )  (   )  (   )    ( BP: 118/75 )  (   )  ( Temp: 98.2  F (36.8  C) )  ( Temp src: Oral )  ( Pulse: 79 )  (   )  ( SpO2: 96 % )    (   Patient Active Problem List   Diagnosis     Morbid obesity (H)     Joint pain     S/P laparoscopic sleeve gastrectomy    )  (   Current Outpatient Prescriptions   Medication Sig Dispense Refill     triamterene-hydrochlorothiazide (MAXZIDE-25) 37.5-25 MG per tablet Take 1 tablet by mouth every morning       )  ( Diabetes Eval:    )    ( Pain Eval:  No Pain (0) )    ( Wound Eval:       )    (   History   Smoking Status     Former Smoker     Packs/day: 0.50     Years: 5.00     Types: Cigarettes     Start date: 1/7/2011     Quit date: 10/27/2016   Smokeless Tobacco     Never Used    )    ( Signed By:  Mallory Hughes; May 10, 2018; 9:33 AM )    "

## 2018-05-10 NOTE — PATIENT INSTRUCTIONS
Goals:  1) Follow bariatric low-fat full liquid diet through day 13 post-op, then to progress to pureed diet x 2 weeks.  If tolerating, may advance on day 29 post-op to bariatric soft diet.   2) Work towards 60 gm protein/day.  3) Consume 48-64 oz fluids daily- between meals.  4) Eat slowly (>20 min/meal), chewing well to smooth consistency once on the bariatric soft diet.  5) Limit portions to 1/2 cup/meal.  6) Start chewy multivitamin/minerals twice daily along with chewy iron from [employer].

## 2018-05-10 NOTE — LETTER
"5/10/2018       RE: Megan Leos  [address]  [place] [address]     Dear Colleague,    Thank you for referring your patient, Megan Leos, to the [facility] SURGICAL WEIGHT MANAGEMENT at [facility]. Please see a copy of my visit note below.    Postoperative bariatric surgery visit.    Patient underwent sleeve gastrectomy 1 week ago.      Tolerating liquids: yes  Lightheadedness: a small amount; ??triamterene  Abdominal pain: some pain RUQ with deep breaths; transient.  Bowel movements: regular now.  Fevers/shakes/chills: no    /75  Pulse 79  Temp 98.2  F (36.8  C) (Oral)  Ht 5' 4.57\"  Wt 239 lb 3.2 oz  SpO2 96%  BMI 40.34 kg/m2  NAD  Overall looks great  Incisions c/d/i    Plan:  1. RD visit today.  2. Start vitamin supplements per RD directions.  3. Advance diet per RD directions.  4. Follow-up: 3-4 weeks.  5. Ursodiol prescribed for 6 months if patient wants to use to prevent gallstones.      Again, thank you for allowing me to participate in the care of your patient.      Sincerely,    Olman Young MD      "

## 2018-05-10 NOTE — PROGRESS NOTES
"Nutrition Assessment  Reason For Visit:  Megan Leos is a 37 year-old female presenting today for nutrition follow-up, 1 week s/p SG (5/10/18) with Dr. Young.    Anthropometrics    Height as of 1/10/18: 1.64 m (5' 4.57\").    Initial weight as of 1/10/18: 122.1 kg (269 lb 1.6 oz) with BMI of 45.38  Day of Surgery Weight: 264.2 lbs recorded, but she recalls she was actually 117 kg/257.4 lbs on the day of surgery.  Current Weight: 239.2 lbs with BMI of 40.34    Weight loss: -29.9 lbs from initial consult; -18.2 lbs from day of surgery    Current Vitamins/Minerals: none  (Pt plans to take [employer] chewy vitamins.  Pt plans to do the monthly B12 injections.)    Nutrition History  Pt reports consuming and tolerating bariatric clear and low-fat full liquid diets. Fluid intake appears adequate, consuming 48-64 oz/day.    Nutrition Prescription:  Grams Protein: 60 (minimum)  Amount of Fluid: 48-64 oz    Nutrition Diagnosis  Food and nutrition-related knowledge deficit r/t lack of prior exposure to diet advancements beyond bariatric low-fat full liquid diet aeb pt unable to verbalize understanding of bariatric pureed and soft diets.    Intervention  Intervention At Appointment:  Materials/education provided on bariatric pureed and soft diets, protein intake, fluid intake, eating pace, portion control, avoiding excess sugar and fat, recommended vitamin/mineral supplements.  Provided Pt with pureed high protein recipes.     Patient Understanding: good  Expected Compliance: good    Goals:  1) Follow bariatric low-fat full liquid diet through day 13 post-op, then to progress to pureed diet x 2 weeks.  If tolerating, may advance on day 29 post-op to bariatric soft diet.   2) Work towards 60 gm protein/day.  3) Consume 48-64 oz fluids daily- between meals.  4) Eat slowly (>20 min/meal), chewing well to smooth consistency once on the bariatric soft diet.  5) Limit portions to 1/2 cup/meal.  6) Start chewy " multivitamin/minerals twice daily along with chewy iron from [employer].    Follow-Up: 3 weeks or prn    Time spent with patient: 30 minutes.  Salome Aguilar MS, RDN, LDN, CLT  Pager: [phone]

## 2018-05-30 ENCOUNTER — CARE COORDINATION (OUTPATIENT)
Dept: SURGERY | Facility: CLINIC | Age: 38
End: 2018-05-30

## 2018-05-30 NOTE — PROGRESS NOTES
Called patient and let her know of other times available and left my number for her to return my call to get in at other available times.

## 2018-06-25 ENCOUNTER — ALLIED HEALTH/NURSE VISIT (OUTPATIENT)
Dept: SURGERY | Facility: CLINIC | Age: 38
End: 2018-06-25
Payer: COMMERCIAL

## 2018-06-25 ENCOUNTER — OFFICE VISIT (OUTPATIENT)
Dept: SURGERY | Facility: CLINIC | Age: 38
End: 2018-06-25
Payer: COMMERCIAL

## 2018-06-25 VITALS
TEMPERATURE: 97.9 F | BODY MASS INDEX: 36.9 KG/M2 | HEART RATE: 56 BPM | SYSTOLIC BLOOD PRESSURE: 105 MMHG | DIASTOLIC BLOOD PRESSURE: 58 MMHG | HEIGHT: 65 IN | OXYGEN SATURATION: 99 % | WEIGHT: 221.5 LBS

## 2018-06-25 DIAGNOSIS — Z98.84 S/P LAPAROSCOPIC SLEEVE GASTRECTOMY: Primary | ICD-10-CM

## 2018-06-25 ASSESSMENT — PAIN SCALES - GENERAL: PAINLEVEL: NO PAIN (0)

## 2018-06-25 NOTE — LETTER
2018       RE: Megan Leos  [address]  [place] [address]     Dear Colleague,    Thank you for referring your patient, Megan Leos, to the [facility] SURGICAL WEIGHT MANAGEMENT at [facility]. Please see a copy of my visit note below.    Return Bariatric Surgery Note    RE: Megan Leos  MR#: [id number]  : 1980  VISIT DATE: 2018    Dear Farideh Bansal PA,    I had the pleasure of seeing your patient, Megan Leos, in my post-bariatric surgery assessment clinic.    CHIEF COMPLAINT: Post-bariatric surgery follow-up. 6 weeks post op visit    HISTORY OF PRESENT ILLNESS:  Questions Regarding Prior Weight Loss Surgery Reviewed With Patient 2018   I had the following weight loss procedure: Sleeve Gastrectomy   What year was your surgery? 2018   How has your weight changed since your last visit? I have lost weight   Are you currently taking any weight loss medications? No   Do you currently have any of the following: None of the above   Have you been to the Emergency room since your last visit with us? No   Were you in the hospital since your last visit with us? No   Do you have any concerns today? none       Weight History:     2018   What is your highest lifetime weight? 274   What is your lowest weight since surgery? (In pounds) 219.6     Initial Weight: 269 lb 1.6 oz  Current Weight: Weight: 221 lb 8 oz  Cumulative weight loss (lbs): 47.6  Last Visits Weight: 239 lb 3.2 oz    Questions Regarding Co-Morbidities and Health Concerns Reviewed With Patient 2018   Pre-diabetes: Never   Diabetes II: Never   High Blood Pressure: Never   High cholesterol: Never   Heartburn/Reflux: Never   Sleep apnea: Never   PCOS: Never   Back pain: Improved   Joint pain: Improved   Lower leg swelling: Never       Eating Habits 2018   How many meals do you eat per day? 3   Do you snack between meals? Sometimes   How much food are you  "eating at each meal? Less than 1/2 cup   Are you able to separate your meals and liquids by at least 30 minutes? Yes   Are you able to avoid liquid calories? Yes       Exercise Questions Reviewed With Patient 6/25/2018   How often do you exercise? 3 to 4 times per week   What is the duration of your exercise (in minutes)? 60+ Minutes   What types of exercise do you do? walking, gym membership, group fitness classes   What keeps you from being more active?  I am as active as I can possbily be       Social History:      6/25/2018   Are you smoking? No   Are you drinking alcohol? No       Medications:  Current Outpatient Prescriptions   Medication     triamterene-hydrochlorothiazide (MAXZIDE-25) 37.5-25 MG per tablet     ursodiol (ACTIGALL) 300 MG capsule     No current facility-administered medications for this visit.          6/25/2018   Do you avoid NSAIDs such as (Ibuprofen, Aleve, Naproxen, Advil)?   Yes       ROS:  GI:      6/25/2018   Vomiting: No   Diarrhea: No   Constipation: No   Swallowing trouble: No   Abdominal pain: No   Heartburn: No   Rash in skin folds: No   Depression: No   Stress urinary incontinence No     Skin:   BAR RBS ROS - SKIN 6/25/2018   Rash in skin folds: No     Psych:      6/25/2018   Depression: No   Anxiety: No     Female Only:   BAR RBS ROS - FEMALE ONLY 6/25/2018   Female only: Regular menstrual cycles       LABS/IMAGING/MEDICAL RECORDS REVIEW:     PHYSICAL EXAMINATION:  /58  Pulse 56  Temp 97.9  F (36.6  C) (Oral)  Ht 5' 4.57\"  Wt 221 lb 8 oz  SpO2 99%  BMI 37.35 kg/m2   General: No apparent distress  Neuro: A & O x 3  Head: Atraumatic, normocephalic  Eyes: PERRL, EOMI  Skin: warm and dry, no rashes on exposed skin  Respiratory: respirations unlabored  Abdomen: soft NT ND  Extremities: No LE swelling      ASSESSMENT AND PLAN:      1. 6 weeks status laparoscopic gastric sleeve. She is doing well.    2. Morbid Obesity current BMI: Body mass index is 37.35 kg/(m^2).  3. Post " surgical malabsorption:   Labs ordered per protocol.   Follow food plan per dietitian recommendations.   Continue taking recommended post-op vitamins.  4. Return to clinic in 6 weeks for 3 month follow visit with Johnna (MARTIN) and labs prior to appt.        Sincerely,    Johnna Romero PA-C

## 2018-06-25 NOTE — PROGRESS NOTES
"Bariatric Nutrition Consult    Nutrition Reassessment  Reason For Visit:  Megan Leos is a 37 year old female presenting today for nutrition follow-up, 1 month s/p SG with Dr. Young. Pt referred by Johnna Romero PA-C (6/25/18).    Anthropometrics:  Height as of 1/10/18: 1.64 m (5' 4.57\").    Initial weight as of 1/10/18: 122.1 kg (269 lb 1.6 oz) with BMI of 45.38  Day of Surgery Weight (5/1/18): 264.2 lbs recorded, but she recalls she was actually 117 kg/257.4 lbs on the day of surgery.  Current Weight: 221.5 lbs  Weight loss: -47.6 lbs from initial consult; -35.9 lbs from day of surgery    Current Vitamins/Minerals: MVI/minerals BID, iron (Patch MD), vitamin D (Patch MD), B12 (Patch MD)    Nutrition History:    Progress with Previous Goals:  1) Follow bariatric low-fat full liquid diet through day 13 post-op, then to progress to pureed diet x 2 weeks.  If tolerating, may advance on day 29 post-op to bariatric soft diet. - Met. Pt is tolerating a regular textured diet, focusing on protein first and she has recently started eating more vegetables.   2) Work towards 60 gm protein/day. - Met and continues.   3) Consume 48-64 oz fluids daily- between meals. - Met and continues. Pt carries a water bottle with her everywhere she goes.   4) Eat slowly (>20 min/meal), chewing well to smooth consistency once on the bariatric soft diet. - Met.   5) Limit portions to 1/2 cup/meal. - Met and continues.   6) Start chewy multivitamin/minerals twice daily along with chewy iron from [employer]. - Met. Pt is trying vitamin patches because she didn't tolerate [employer]/other chewable vitamins.     Nutrition Prescription:  Grams Protein: 60 (minimum)  Amount of Fluid: 48-64 oz    Nutrition Diagnosis  Previous: Food and nutrition-related knowledge deficit r/t lack of prior exposure to diet advancements beyond bariatric low-fat full liquid diet aeb pt unable to verbalize understanding of bariatric pureed and " soft diets. - resolved.     Current: Food and nutrition-related knowledge deficit r/t lack of prior exposure to diet advancements beyond bariatric pureed diet aeb pt unable to verbalize full understanding of bariatric soft and regular consistency diets.     And     Obesity r/t long history of self-monitoring deficit and excessive energy intake aeb BMI >30.    Intervention  Materials/Education provided on bariatric soft and regular consistency diets, protein intake, fluid intake, eating pace, chewing foods well, portion control, sugar/fat intake, recommended vitamin/mineral supplements. Discussed vitamin patch efficacy and plan is to monitor labs to ensure pt is absorbing adequate amounts of vitamin/minerals via patches. Gave encouragement and support. Provided pt with list of goals and RD contact information.     Patient Understanding: Good  Expected Compliance: Good    Goals:  1) Eat 3 meals per day, focus on protein.    2) Consume 60-80 grams of protein/day.  3) Sip on 48-64 oz of fluids/day- between meals only.  4) Eat slowly (>20 min/meal), chewing foods well (to applesauce-like consistency).  5) Limit portions to 1/2 cup/meal.  6) -Take the following after a Sleeve Gastrectomy:    Multivitamin/minerals: adult dose 2 times daily    Iron: 45-60 mg elemental (18-36 mg if low risk) - may partly or fully be covered in multivitamin     Calcium Citrate containing vitamin D: 500 mg 3 times daily or 600 mg 2 times daily    Vitamin B12: sublingual form of at least 500 mcg daily or injection of 1000 mcg monthly     Vitamin D3: 3,000 International Units daily       Follow-Up: prn    Time spent with patient: 15 minutes    Ying Stephens RD, LD  Pager: [phone]

## 2018-06-25 NOTE — PATIENT INSTRUCTIONS
Goals:  1) Eat 3 meals per day, focus on protein.    2) Consume 60-80 grams of protein/day.  3) Sip on 48-64 oz of fluids/day- between meals only.  4) Eat slowly (>20 min/meal), chewing foods well (to applesauce-like consistency).  5) Limit portions to 1/2 cup/meal.  6) -Take the following after a Sleeve Gastrectomy:    Multivitamin/minerals: adult dose 2 times daily    Iron: 45-60 mg elemental (18-36 mg if low risk) - may partly or fully be covered in multivitamin     Calcium Citrate containing vitamin D: 500 mg 3 times daily or 600 mg 2 times daily    Vitamin B12: sublingual form of at least 500 mcg daily or injection of 1000 mcg monthly     Vitamin D3: 3,000 International Units daily    Aida Stephens RD, LD  Voicemail: [phone]  Appointments: [phone]

## 2018-06-25 NOTE — PROGRESS NOTES
Return Bariatric Surgery Note    RE: Megan Leos  MR#: [id number]  : 1980  VISIT DATE: 2018    Dear Farideh Bansal PA,    I had the pleasure of seeing your patient, Megan Leos, in my post-bariatric surgery assessment clinic.    CHIEF COMPLAINT: Post-bariatric surgery follow-up. 6 weeks post op visit    HISTORY OF PRESENT ILLNESS:  Questions Regarding Prior Weight Loss Surgery Reviewed With Patient 2018   I had the following weight loss procedure: Sleeve Gastrectomy   What year was your surgery? 2018   How has your weight changed since your last visit? I have lost weight   Are you currently taking any weight loss medications? No   Do you currently have any of the following: None of the above   Have you been to the Emergency room since your last visit with us? No   Were you in the hospital since your last visit with us? No   Do you have any concerns today? none       Weight History:     2018   What is your highest lifetime weight? 274   What is your lowest weight since surgery? (In pounds) 219.6     Initial Weight: 269 lb 1.6 oz  Current Weight: Weight: 221 lb 8 oz  Cumulative weight loss (lbs): 47.6  Last Visits Weight: 239 lb 3.2 oz    Questions Regarding Co-Morbidities and Health Concerns Reviewed With Patient 2018   Pre-diabetes: Never   Diabetes II: Never   High Blood Pressure: Never   High cholesterol: Never   Heartburn/Reflux: Never   Sleep apnea: Never   PCOS: Never   Back pain: Improved   Joint pain: Improved   Lower leg swelling: Never       Eating Habits 2018   How many meals do you eat per day? 3   Do you snack between meals? Sometimes   How much food are you eating at each meal? Less than 1/2 cup   Are you able to separate your meals and liquids by at least 30 minutes? Yes   Are you able to avoid liquid calories? Yes       Exercise Questions Reviewed With Patient 2018   How often do you exercise? 3 to 4 times per week   What is the duration of your  "exercise (in minutes)? 60+ Minutes   What types of exercise do you do? walking, gym membership, group fitness classes   What keeps you from being more active?  I am as active as I can possbily be       Social History:      6/25/2018   Are you smoking? No   Are you drinking alcohol? No       Medications:  Current Outpatient Prescriptions   Medication     triamterene-hydrochlorothiazide (MAXZIDE-25) 37.5-25 MG per tablet     ursodiol (ACTIGALL) 300 MG capsule     No current facility-administered medications for this visit.          6/25/2018   Do you avoid NSAIDs such as (Ibuprofen, Aleve, Naproxen, Advil)?   Yes       ROS:  GI:      6/25/2018   Vomiting: No   Diarrhea: No   Constipation: No   Swallowing trouble: No   Abdominal pain: No   Heartburn: No   Rash in skin folds: No   Depression: No   Stress urinary incontinence No     Skin:   BAR RBS ROS - SKIN 6/25/2018   Rash in skin folds: No     Psych:      6/25/2018   Depression: No   Anxiety: No     Female Only:   BAR RBS ROS - FEMALE ONLY 6/25/2018   Female only: Regular menstrual cycles       LABS/IMAGING/MEDICAL RECORDS REVIEW:     PHYSICAL EXAMINATION:  /58  Pulse 56  Temp 97.9  F (36.6  C) (Oral)  Ht 5' 4.57\"  Wt 221 lb 8 oz  SpO2 99%  BMI 37.35 kg/m2   General: No apparent distress  Neuro: A & O x 3  Head: Atraumatic, normocephalic  Eyes: PERRL, EOMI  Skin: warm and dry, no rashes on exposed skin  Respiratory: respirations unlabored  Abdomen: soft NT ND  Extremities: No LE swelling      ASSESSMENT AND PLAN:      1. 6 weeks status laparoscopic gastric sleeve. She is doing well.    2. Morbid Obesity current BMI: Body mass index is 37.35 kg/(m^2).  3. Post surgical malabsorption:   Labs ordered per protocol.   Follow food plan per dietitian recommendations.   Continue taking recommended post-op vitamins.  4. Return to clinic in 6 weeks for 3 month follow visit with Johnna (MARTIN) and labs prior to appt.        Sincerely,    Johnna Romero, " NORAMN

## 2018-06-25 NOTE — MR AVS SNAPSHOT
MRN:[id number]                      After Visit Summary   6/25/2018    Megan Leos    MRN: [id number]           Visit Information        Provider Department      6/25/2018 8:00 AM Ying Stephens RD M [facility] Surgical [facility] Management        Your next 10 appointments already scheduled     Jun 25, 2018  8:00 AM CDT   (Arrive by 7:45 AM)   NUTRITION VISIT with DANY Kennedy [facility] ([facility] [place])    [address] [place] [address] [phone]              Care Instructions    Goals:  1) Eat 3 meals per day, focus on protein.    2) Consume 60-80 grams of protein/day.  3) Sip on 48-64 oz of fluids/day- between meals only.  4) Eat slowly (>20 min/meal), chewing foods well (to applesauce-like consistency).  5) Limit portions to 1/2 cup/meal.  6) -Take the following after a Sleeve Gastrectomy:    Multivitamin/minerals: adult dose 2 times daily    Iron: 45-60 mg elemental (18-36 mg if low risk) - may partly or fully be covered in multivitamin     Calcium Citrate containing vitamin D: 500 mg 3 times daily or 600 mg 2 times daily    Vitamin B12: sublingual form of at least 500 mcg daily or injection of 1000 mcg monthly     Vitamin D3: 3,000 International Units daily    Aida Stephens RD, LD  Voicemail: [phone]  Appointments: [phone]           [facility] Information     [employer] gives you secure access to your electronic health record. If you see a primary care provider, you can also send messages to your care team and make appointments. If you have questions, please call your primary care clinic.  If you do not have a primary care provider, please call [phone] and they will assist you.      [employer] is an electronic gateway that provides easy, online access to your medical records. With [employer], you can request a clinic appointment, read your test results, renew a prescription or communicate with your  care team.     To access your existing account, please contact your [facility] or call [phone] for assistance.        Care EveryWhere ID     This is your Care EveryWhere ID. This could be used by other organizations to access your [facility] medical records  [id number]        Equal Access to Services     MONICA AMADOR : Danis Serra, vaishali parker, rigoberto farnsworth, simran jones. So [facility] [phone].    ATENCIÓN: Si habla español, tiene a lang disposición servicios gratuitos de asistencia lingüística. Llame al [phone].    We comply with applicable federal civil rights laws and [place] laws. We do not discriminate on the basis of race, color, national origin, age, disability, sex, sexual orientation, or gender identity.

## 2018-06-25 NOTE — NURSING NOTE
"(   Chief Complaint   Patient presents with     RECHECK     S/P LSG 5/1/18     )    ( Weight: 221 lb 8 oz )  ( Height: 5' 4.57\" )  ( BMI (Calculated): 37.43 )  ( Initial Weight: 269 lb 1.6 oz )  ( Cumulative weight loss (lbs): 47.6 )  ( Last Visits Weight: 239 lb 3.2 oz )  ( Wt change since last visit (lbs): -17.7 )  (   )  (   )    ( BP: 105/58 )  (   )  ( Temp: 97.9  F (36.6  C) )  ( Temp src: Oral )  ( Pulse: 56 )  (   )  ( SpO2: 99 % )    (   Patient Active Problem List   Diagnosis     Morbid obesity (H)     Joint pain     S/P laparoscopic sleeve gastrectomy    )  (   Current Outpatient Prescriptions   Medication Sig Dispense Refill     triamterene-hydrochlorothiazide (MAXZIDE-25) 37.5-25 MG per tablet Take 1 tablet by mouth every morning        ursodiol (ACTIGALL) 300 MG capsule Take 1 capsule (300 mg) by mouth 2 times daily 180 capsule 1    )  ( Diabetes Eval:    )    ( Pain Eval:  No Pain (0) )    ( Wound Eval:       )    (   History   Smoking Status     Former Smoker     Packs/day: 0.50     Years: 5.00     Types: Cigarettes     Start date: 1/7/2011     Quit date: 10/27/2016   Smokeless Tobacco     Never Used    )    ( Signed By:  Mallory Hughes; June 25, 2018; 7:06 AM )    "

## 2018-06-25 NOTE — MR AVS SNAPSHOT
After Visit Summary   6/25/2018    Megan Leos    MRN: [id number]           Patient Information     Date Of Birth          1980        Visit Information        Provider Department      6/25/2018 7:15 AM Johnna Romero PA-C [facility] Surgical [facility] Management        Today's Diagnoses     S/P laparoscopic sleeve gastrectomy    -  1      Care Instructions    See Johnna in 6 weeks for RBS with labs prior to visit              Follow-ups after your visit        Your next 10 appointments already scheduled     Jun 25, 2018  8:00 AM CDT   (Arrive by 7:45 AM)   NUTRITION VISIT with DAYN Kennedy [facility] ([facility] [place])    [address]  [address] [facility] [address] [phone]              Future tests that were ordered for you today     Open Future Orders        Priority Expected Expires Ordered    CBC with platelets Routine 8/1/2018 9/25/2018 6/25/2018    Comprehensive metabolic panel Routine 8/1/2018 9/25/2018 6/25/2018    Parathyroid Hormone Intact Routine 8/1/2018 9/25/2018 6/25/2018    Vitamin B12 Routine 8/1/2018 9/25/2018 6/25/2018    Vitamin A Routine 8/1/2018 9/25/2018 6/25/2018    Vitamin D Deficiency Routine 8/1/2018 9/25/2018 6/25/2018            Who to contact     Please call your clinic at [phone] to:    Ask questions about your health    Make or cancel appointments    Discuss your medicines    Learn about your test results    Speak to your doctor            Additional Information About Your Visit        [employer] Information     [employer] gives you secure access to your electronic health record. If you see a primary care provider, you can also send messages to your care team and make appointments. If you have questions, please call your primary care clinic.  If you do not have a primary care provider, please call [phone] and they will assist you.      [employer] is an electronic gateway that  "provides easy, online access to your medical records. With [facility] [employer], you can request a clinic appointment, read your test results, renew a prescription or communicate with your care team.     To access your existing account, please contact your [facility] or call [phone] for assistance.        Care EveryWhere ID     This is your Care EveryWhere ID. This could be used by other organizations to access your [facility] medical records  [id number]        Your Vitals Were     Pulse Temperature Height Pulse Oximetry BMI (Body Mass Index)       56 97.9  F (36.6  C) (Oral) 5' 4.57\" 99% 37.35 kg/m2        Blood Pressure from Last 3 Encounters:   06/25/18 105/58   05/10/18 118/75   05/02/18 139/78    Weight from Last 3 Encounters:   06/25/18 221 lb 8 oz   05/10/18 239 lb 3.2 oz   05/01/18 264 lb 3.2 oz               Primary Care Provider Office Phone # Fax #    BERENICE Fontana, NORMAN [phone]       [facility] [address]  [facility] [address]        Equal Access to Services     [facility]: Hadii aad ku hadasho Soomaali, waaxda luqadaha, qaybta kaalmada adeegyada, simran cameron haysharonn joseph mendez . So [facility] [phone].    ATENCIÓN: Si habla español, tiene a lang disposición servicios gratuitos de asistencia lingüística. [facility] [phone].    We comply with applicable federal civil rights laws and [place] laws. We do not discriminate on the basis of race, color, national origin, age, disability, sex, sexual orientation, or gender identity.            Thank you!     Thank you for choosing [facility] SURGICAL WEIGHT MANAGEMENT  for your care. Our goal is always to provide you with excellent care. Hearing back from our patients is one way we can continue to improve our services. Please take a few minutes to complete the written survey that you may receive in the mail after your visit with us. Thank you!             Your Updated Medication List - Protect " others around you: Learn how to safely use, store and throw away your medicines at www.disposemymeds.org.          This list is accurate as of 6/25/18  7:28 AM.  Always use your most recent med list.                   Brand Name Dispense Instructions for use Diagnosis    triamterene-hydrochlorothiazide 37.5-25 MG per tablet    MAXZIDE-25     Take 1 tablet by mouth every morning        ursodiol 300 MG capsule    ACTIGALL    180 capsule    Take 1 capsule (300 mg) by mouth 2 times daily    Achlorhydria, gastric

## 2018-07-09 ENCOUNTER — TELEPHONE (OUTPATIENT)
Dept: SURGERY | Facility: CLINIC | Age: 38
End: 2018-07-09

## 2018-07-09 ENCOUNTER — CARE COORDINATION (OUTPATIENT)
Dept: SURGERY | Facility: CLINIC | Age: 38
End: 2018-07-09

## 2018-07-09 NOTE — TELEPHONE ENCOUNTER
Sojohn with Patient, Advised patient provider was not avail on 08/2018. Patient stated that she was suppose to be seen sooner and have some questions pertaining her POST-OP. Did advise patient I would have a nurse to give her  A call to discuss post-op questions.

## 2018-08-08 ENCOUNTER — ALLIED HEALTH/NURSE VISIT (OUTPATIENT)
Dept: SURGERY | Facility: CLINIC | Age: 38
End: 2018-08-08
Payer: COMMERCIAL

## 2018-08-08 ENCOUNTER — OFFICE VISIT (OUTPATIENT)
Dept: SURGERY | Facility: CLINIC | Age: 38
End: 2018-08-08
Payer: COMMERCIAL

## 2018-08-08 VITALS
WEIGHT: 213.7 LBS | DIASTOLIC BLOOD PRESSURE: 54 MMHG | TEMPERATURE: 97.9 F | SYSTOLIC BLOOD PRESSURE: 100 MMHG | BODY MASS INDEX: 35.6 KG/M2 | HEART RATE: 55 BPM | HEIGHT: 65 IN | OXYGEN SATURATION: 97 %

## 2018-08-08 DIAGNOSIS — Z98.84 S/P LAPAROSCOPIC SLEEVE GASTRECTOMY: Primary | ICD-10-CM

## 2018-08-08 DIAGNOSIS — Z98.84 S/P LAPAROSCOPIC SLEEVE GASTRECTOMY: ICD-10-CM

## 2018-08-08 LAB
ALBUMIN SERPL-MCNC: 3 G/DL (ref 3.4–5)
ALP SERPL-CCNC: 55 U/L (ref 40–150)
ALT SERPL W P-5'-P-CCNC: 17 U/L (ref 0–50)
ANION GAP SERPL CALCULATED.3IONS-SCNC: 7 MMOL/L (ref 3–14)
AST SERPL W P-5'-P-CCNC: 14 U/L (ref 0–45)
BILIRUB SERPL-MCNC: 0.5 MG/DL (ref 0.2–1.3)
BUN SERPL-MCNC: 7 MG/DL (ref 7–30)
CALCIUM SERPL-MCNC: 8.4 MG/DL (ref 8.5–10.1)
CHLORIDE SERPL-SCNC: 107 MMOL/L (ref 94–109)
CO2 SERPL-SCNC: 25 MMOL/L (ref 20–32)
CREAT SERPL-MCNC: 0.78 MG/DL (ref 0.52–1.04)
DEPRECATED CALCIDIOL+CALCIFEROL SERPL-MC: 28 UG/L (ref 20–75)
ERYTHROCYTE [DISTWIDTH] IN BLOOD BY AUTOMATED COUNT: 13.4 % (ref 10–15)
GFR SERPL CREATININE-BSD FRML MDRD: 83 ML/MIN/1.7M2
GLUCOSE SERPL-MCNC: 84 MG/DL (ref 70–99)
HCT VFR BLD AUTO: 37 % (ref 35–47)
HGB BLD-MCNC: 12.3 G/DL (ref 11.7–15.7)
MCH RBC QN AUTO: 30.4 PG (ref 26.5–33)
MCHC RBC AUTO-ENTMCNC: 33.2 G/DL (ref 31.5–36.5)
MCV RBC AUTO: 91 FL (ref 78–100)
PLATELET # BLD AUTO: 218 10E9/L (ref 150–450)
POTASSIUM SERPL-SCNC: 3.8 MMOL/L (ref 3.4–5.3)
PROT SERPL-MCNC: 6.8 G/DL (ref 6.8–8.8)
PTH-INTACT SERPL-MCNC: 57 PG/ML (ref 18–80)
RBC # BLD AUTO: 4.05 10E12/L (ref 3.8–5.2)
SODIUM SERPL-SCNC: 139 MMOL/L (ref 133–144)
VIT B12 SERPL-MCNC: 363 PG/ML (ref 193–986)
WBC # BLD AUTO: 5.8 10E9/L (ref 4–11)

## 2018-08-08 RX ORDER — CALCIUM CARBONATE 500(1250)
1 TABLET ORAL 2 TIMES DAILY
COMMUNITY

## 2018-08-08 RX ORDER — UBIDECARENONE 75 MG
100 CAPSULE ORAL DAILY
COMMUNITY

## 2018-08-08 ASSESSMENT — PAIN SCALES - GENERAL: PAINLEVEL: NO PAIN (0)

## 2018-08-08 NOTE — MR AVS SNAPSHOT
After Visit Summary   8/8/2018    Megan Leos    MRN: [id number]           Patient Information     Date Of Birth          1980        Visit Information        Provider Department      8/8/2018 7:00 AM Johnna Romero PA-C [facility] Surgical Weight Management        Today's Diagnoses     S/P laparoscopic sleeve gastrectomy    -  1      Care Instructions    See Johnna and dietitian in 3 months for return bariatric    Labs today            Follow-ups after your visit        Your next 10 appointments already scheduled     Aug 08, 2018  7:30 AM CDT   (Arrive by 7:15 AM)   NUTRITION VISIT with DANY Vega [facility] ([facility])    [address] [place] [address] [phone]            Aug 30, 2018  7:00 AM CDT   (Arrive by 6:45 AM)   Return Bariatric Nutrition Visit with DANY Gramajo [facility] ([facility])    [address] [place] [address] [phone]              Who to contact     Please call your clinic at [phone] to:    Ask questions about your health    Make or cancel appointments    Discuss your medicines    Learn about your test results    Speak to your doctor            Additional Information About Your Visit        [employer] Information     [employer] gives you secure access to your electronic health record. If you see a primary care provider, you can also send messages to your care team and make appointments. If you have questions, please call your primary care clinic.  If you do not have a primary care provider, please call [phone] and they will assist you.      [employer] is an electronic gateway that provides easy, online access to your medical records. With [employer], you can request a clinic appointment, read your test results, renew a prescription or communicate with your care team.    "  To access your existing account, please contact your [facility] or call [phone] for assistance.        Care EveryWhere ID     This is your Care EveryWhere ID. This could be used by other organizations to access your [facility] medical records  [id number]        Your Vitals Were     Pulse Temperature Height Pulse Oximetry BMI (Body Mass Index)       55 97.9  F (36.6  C) (Oral) 5' 4.57\" 97% 36.04 kg/m2        Blood Pressure from Last 3 Encounters:   08/08/18 100/54   06/25/18 105/58   05/10/18 118/75    Weight from Last 3 Encounters:   08/08/18 213 lb 11.2 oz   06/25/18 221 lb 8 oz   05/10/18 239 lb 3.2 oz              Today, you had the following     No orders found for display       Primary Care Provider Office Phone # Fax #    BERENICE Fontana, NORMAN [phone]       [place] [address]  [facility] [address]        Equal Access to Services     SKYLER AMADOR : Hadii aad ku hadasho Soomaali, waaxda luqadaha, qaybta kaalmada adeegyada, waxay idiin haysharonn joseph mendez . So [facility] [phone].    ATENCIÓN: Si habla español, tiene a lang disposición servicios gratuitos de asistencia lingüística. Llame al [phone].    We comply with applicable federal civil rights laws and [place] laws. We do not discriminate on the basis of race, color, national origin, age, disability, sex, sexual orientation, or gender identity.            Thank you!     Thank you for choosing [facility] SURGICAL WEIGHT MANAGEMENT  for your care. Our goal is always to provide you with excellent care. Hearing back from our patients is one way we can continue to improve our services. Please take a few minutes to complete the written survey that you may receive in the mail after your visit with us. Thank you!             Your Updated Medication List - Protect others around you: Learn how to safely use, store and throw away your medicines at www.disposemymeds.org.          This list " is accurate as of 8/8/18  7:22 AM.  Always use your most recent med list.                   Brand Name Dispense Instructions for use Diagnosis    calcium carb-cholecalciferol 600-500 MG-UNIT Caps           calcium carbonate 500 MG tablet    OS-VICTOR M 500 mg Platinum. Ca     Take 1 tablet by mouth 2 times daily        cyanocolbalamin 100 MCG tablet    vitamin  B-12     Take 100 mcg by mouth daily        MULTIVITAMIN & MINERAL PO           triamterene-hydrochlorothiazide 37.5-25 MG per tablet    MAXZIDE-25     Take 1 tablet by mouth every morning        ursodiol 300 MG capsule    ACTIGALL    180 capsule    Take 1 capsule (300 mg) by mouth 2 times daily    Achlorhydria, gastric       VITAMIN D (CHOLECALCIFEROL) PO      Take by mouth daily

## 2018-08-08 NOTE — PROGRESS NOTES
"Bariatric Nutrition Consult    Nutrition Reassessment  Reason For Visit:  Megan Leos is a 37 year old female presenting today for nutrition follow-up, 3 months s/p SG with Dr. Young. Pt referred by Johnna Romero PA-C (6/25/18).    Anthropometrics:  Height as of 1/10/18: 1.64 m (5' 4.57\").    Initial weight as of 1/10/18: 122.1 kg (269 lb 1.6 oz) with BMI of 45.38  Day of Surgery Weight (5/1/18): 255.7 lbs  Current Weight: 213.7 lbs  Weight loss: -55.4 lbs from initial consult; -42 lbs from day of surgery    Current Vitamins/Minerals: MVI/minerals BID, iron (Patch MD), vitamin D (Patch MD), B12 (Patch MD)    Nutrition History:    Progress with Previous Goals:  1) Eat 3 meals per day, focus on protein.  Three meals may snack on string cheese  2) Consume 60-80 grams of protein/day. Only occasionally having protein shake 60-80 gm per day  3) Sip on 48-64 oz of fluids/day- between meals only. 3 bottles per day  4) Eat slowly (>20 min/meal), chewing foods well (to applesauce-like consistency). Met/continues  5) Limit portions to 1/2 cup/meal. 1/4- 1/2 cup at meals  6) -Take the following after a Sleeve Gastrectomy: using multivitamin patch due to not tolerating oral vitamins    Multivitamin/minerals: adult dose 2 times daily    Iron: 45-60 mg elemental (18-36 mg if low risk) - may partly or fully be covered in multivitamin     Calcium Citrate containing vitamin D: 500 mg 3 times daily or 600 mg 2 times daily    Vitamin B12: sublingual form of at least 500 mcg daily or injection of 1000 mcg monthly     Vitamin D3: 3,000 International Units daily      Nutrition Prescription:  Grams Protein: 60 (minimum)  Amount of Fluid: 48-64 oz    Nutrition Diagnosis  Previous: Food and nutrition-related knowledge deficit r/t lack of prior exposure to diet advancements beyond bariatric pureed diet aeb pt unable to verbalize full understanding of bariatric soft and regular consistency diets. - resolved.     Current: Obesity r/t " long history of self-monitoring deficit and excessive energy intake aeb BMI >30.    Intervention  Materials/Education provided, reviewed previous goals.  Discussed vitamin patch efficacy and plan is to monitor labs to ensure pt is absorbing adequate amounts of vitamin/minerals via patches. Encouraged continuing to add in physical activity.  Gave encouragement and support. Provided pt with list of goals and RD contact information.     Patient Understanding: Good  Expected Compliance: Good    Goals:  1) Eat 3 meals per day, focus on protein.    2) Consume 60-80 grams of protein/day.  3) Sip on 48-64 oz of fluids/day- between meals only.  4) Eat slowly (>20 min/meal), chewing foods well (to applesauce-like consistency).  5) Limit portions to 1/2 cup/meal.  6) -Take the following after a Sleeve Gastrectomy:    Multivitamin/minerals: adult dose 2 times daily    Iron: 45-60 mg elemental (18-36 mg if low risk) - may partly or fully be covered in multivitamin     Calcium Citrate containing vitamin D: 500 mg 3 times daily or 600 mg 2 times daily    Vitamin B12: sublingual form of at least 500 mcg daily or injection of 1000 mcg monthly     Vitamin D3: 3,000 International Units daily  7) Continue daily exercise        Follow-Up: prn    Time spent with patient: 15 minutes    Soo Simms RD, LD

## 2018-08-08 NOTE — PROGRESS NOTES
Return Bariatric Surgery Note    RE: Megan Leos  MR#: [id number]  : 1980  VISIT DATE: Aug 8, 2018    Dear Farideh Bansal PA,    I had the pleasure of seeing your patient, Megan Leos, in my post-bariatric surgery assessment clinic.    CHIEF COMPLAINT: Post-bariatric surgery follow-up. 3 mo follow up    HISTORY OF PRESENT ILLNESS:  Questions Regarding Prior Weight Loss Surgery Reviewed With Patient 2018   I had the following weight loss procedure: Sleeve Gastrectomy   What year was your surgery? 2018   How has your weight changed since your last visit? I have lost weight   Are you currently taking any weight loss medications? No   Do you currently have any of the following: None of the above   Have you been to the Emergency room since your last visit with us? No   Were you in the hospital since your last visit with us? No   Do you have any concerns today? none       Weight History:     2018   What is your highest lifetime weight? 274   What is your lowest weight since surgery? (In pounds) 219.6     Initial Weight: 269 lb 1.6 oz  Current Weight: Weight: 213 lb 11.2 oz  Cumulative weight loss (lbs): 55.4  Last Visits Weight: 221 lb 8 oz    Questions Regarding Co-Morbidities and Health Concerns Reviewed With Patient 2018   Pre-diabetes: Never   Diabetes II: Never   High Blood Pressure: Never   High cholesterol: Never   Heartburn/Reflux: Never   Sleep apnea: Never   PCOS: Never   Back pain: Improved   Joint pain: Improved   Lower leg swelling: Never       Eating Habits 2018   How many meals do you eat per day? 3   Do you snack between meals? Sometimes   How much food are you eating at each meal? Less than 1/2 cup   Are you able to separate your meals and liquids by at least 30 minutes? Yes   Are you able to avoid liquid calories? Yes       Exercise Questions Reviewed With Patient 2018   How often do you exercise? 3 to 4 times per week   What is the duration of your exercise (in  "minutes)? 60+ Minutes   What types of exercise do you do? walking, gym membership, group fitness classes   What keeps you from being more active?  I am as active as I can possbily be       Social History:      6/25/2018   Are you smoking? No   Are you drinking alcohol? No       Medications:  Current Outpatient Prescriptions   Medication     calcium carb-cholecalciferol 600-500 MG-UNIT CAPS     calcium carbonate (OS-VICTOR M 500 MG [place]. [place]) 500 MG tablet     cyanocolbalamin (VITAMIN  B-12) 100 MCG tablet     Multiple Vitamins-Minerals (MULTIVITAMIN & MINERAL PO)     triamterene-hydrochlorothiazide (MAXZIDE-25) 37.5-25 MG per tablet     ursodiol (ACTIGALL) 300 MG capsule     VITAMIN D, CHOLECALCIFEROL, PO     No current facility-administered medications for this visit.          6/25/2018   Do you avoid NSAIDs such as (Ibuprofen, Aleve, Naproxen, Advil)?   Yes       ROS:  GI:      6/25/2018   Vomiting: No   Diarrhea: No   Constipation: No   Swallowing trouble: No   Abdominal pain: No   Heartburn: No   Rash in skin folds: No   Depression: No   Stress urinary incontinence No     Skin:   BAR RBS ROS - SKIN 6/25/2018   Rash in skin folds: No     Psych:      6/25/2018   Depression: No   Anxiety: No     Female Only:   BAR RBS ROS - FEMALE ONLY 6/25/2018   Female only: Regular menstrual cycles       LABS/IMAGING/MEDICAL RECORDS REVIEW:     PHYSICAL EXAMINATION:  /54 (BP Location: Left arm, Patient Position: Chair, Cuff Size: Adult Large)  Pulse 55  Temp 97.9  F (36.6  C) (Oral)  Ht 5' 4.57\"  Wt 213 lb 11.2 oz  SpO2 97%  BMI 36.04 kg/m2   General: No apparent distress  Neuro: A & O x 3  Head: Atraumatic, normocephalic  Eyes: PERRL, EOMI  Skin: warm and dry, no rashes on exposed skin  Respiratory: respirations unlabored  Abdomen: soft NT ND lap sites well healed  Extremities: No LE swelling        ASSESSMENT AND PLAN:      1. 3 months status laparoscopic gastric sleeve  2. Morbid Obesity current BMI: Body mass index is " 36.04 kg/(m^2).  3. Post surgical malabsorption:   Labs ordered per protocol.   Follow food plan per dietitian recommendations.   Continue taking recommended post-op vitamins.  4. Return to clinic in 3 months.        Sincerely,    Johnna Romero PA-C    I spent a total of 15 minutes face to face with Megan during today's office visit. Over 50% of this time was spent counseling the patient and/or coordinating care.

## 2018-08-08 NOTE — MR AVS SNAPSHOT
MRN:[id number]                      After Visit Summary   8/8/2018    Megan Leos    MRN: [id number]           Visit Information        Provider Department      8/8/2018 7:30 AM Soo Simms RD M [facility]        Your next 10 appointments already scheduled     Aug 30, 2018  7:00 AM CDT   (Arrive by 6:45 AM)   Return Bariatric Nutrition Visit with DANY Gramajo [facility] ([facility])    [address] [place] [id number] [phone]              Care Instructions    Goals:  1) Eat 3 meals per day, focus on protein.    2) Consume 60-80 grams of protein/day.  3) Sip on 48-64 oz of fluids/day- between meals only.  4) Eat slowly (>20 min/meal), chewing foods well (to applesauce-like consistency).  5) Limit portions to 1/2 cup/meal.  6) -Take the following after a Sleeve Gastrectomy:    Multivitamin/minerals: adult dose 2 times daily    Iron: 45-60 mg elemental (18-36 mg if low risk) - may partly or fully be covered in multivitamin     Calcium Citrate containing vitamin D: 500 mg 3 times daily or 600 mg 2 times daily    Vitamin B12: sublingual form of at least 500 mcg daily or injection of 1000 mcg monthly     Vitamin D3: 3,000 International Units daily  7) Continue daily exercise     Follow up with RD in 3 months    Soo Simms RD, LD  If you need to schedule or reschedule with a dietitian please call [phone] or [phone].           [employer] Information     [employer] gives you secure access to your electronic health record. If you see a primary care provider, you can also send messages to your care team and make appointments. If you have questions, please call your primary care clinic.  If you do not have a primary care provider, please call [phone] and they will assist you.      [employer] is an electronic gateway that provides easy, online access to your medical  records. With [employer], you can request a clinic appointment, read your test results, renew a prescription or communicate with your care team.     To access your existing account, please contact your [facility] or call [phone] for assistance.        Care EveryWhere ID     This is your Care EveryWhere ID. This could be used by other organizations to access your [facility] medical records  [id number]        Equal Access to Services     MONICA AMADOR : Danis Serra, wanieves parker, qanick kaalmada javad, simran jones. So [facility] [phone].    ATENCIÓN: Si habla español, tiene a lang disposición servicios gratuitos de asistencia lingüística. Llame al [phone].    We comply with applicable federal civil rights laws and [place] laws. We do not discriminate on the basis of race, color, national origin, age, disability, sex, sexual orientation, or gender identity.

## 2018-08-08 NOTE — NURSING NOTE
"(   Chief Complaint   Patient presents with     Weight Check     Return, bariatric weight surgery.     )    ( Weight: 213 lb 11.2 oz )  ( Height: 5' 4.57\" )  ( BMI (Calculated): 36.11 )  ( Initial Weight: 269 lb 1.6 oz )  ( Cumulative weight loss (lbs): 55.4 )  ( Last Visits Weight: 221 lb 8 oz )  ( Wt change since last visit (lbs): -7.8 )  (   )  (   )    ( BP: 100/54 )  (   )  ( Temp: 97.9  F (36.6  C) )  ( Temp src: Oral )  ( Pulse: 55 )  (   )  ( SpO2: 97 % )    (   Patient Active Problem List   Diagnosis     Morbid obesity (H)     Joint pain     S/P laparoscopic sleeve gastrectomy    )  (   Current Outpatient Prescriptions   Medication Sig Dispense Refill     calcium carb-cholecalciferol 600-500 MG-UNIT CAPS        calcium carbonate (OS-VICTOR M 500 MG [place]. [place]) 500 MG tablet Take 1 tablet by mouth 2 times daily       cyanocolbalamin (VITAMIN  B-12) 100 MCG tablet Take 100 mcg by mouth daily       Multiple Vitamins-Minerals (MULTIVITAMIN & MINERAL PO)        triamterene-hydrochlorothiazide (MAXZIDE-25) 37.5-25 MG per tablet Take 1 tablet by mouth every morning        ursodiol (ACTIGALL) 300 MG capsule Take 1 capsule (300 mg) by mouth 2 times daily 180 capsule 1     VITAMIN D, CHOLECALCIFEROL, PO Take by mouth daily      )  ( Diabetes Eval:    )    ( Pain Eval:  No Pain (0) )    ( Wound Eval:       )    (   History   Smoking Status     Former Smoker     Packs/day: 0.50     Years: 5.00     Types: Cigarettes     Start date: 1/7/2011     Quit date: 10/27/2016   Smokeless Tobacco     Never Used    )    ( Signed By:  Marino Rendon; August 8, 2018; 7:05 AM )  "

## 2018-08-08 NOTE — LETTER
2018       RE: Megan Leos  [address]  [place] [id number]     Dear Colleague,    Thank you for referring your patient, Megan Leos, to the [facility] at [facility]. Please see a copy of my visit note below.    Return Bariatric Surgery Note    RE: Megan Leos  MR#: [id number]  : 1980  VISIT DATE: Aug 8, 2018    Dear Farideh Bansal PA,    I had the pleasure of seeing your patient, Megan Leos, in my post-bariatric surgery assessment clinic.    CHIEF COMPLAINT: Post-bariatric surgery follow-up. 3 mo follow up    HISTORY OF PRESENT ILLNESS:  Questions Regarding Prior Weight Loss Surgery Reviewed With Patient 2018   I had the following weight loss procedure: Sleeve Gastrectomy   What year was your surgery? 2018   How has your weight changed since your last visit? I have lost weight   Are you currently taking any weight loss medications? No   Do you currently have any of the following: None of the above   Have you been to the Emergency room since your last visit with us? No   Were you in the hospital since your last visit with us? No   Do you have any concerns today? none       Weight History:     2018   What is your highest lifetime weight? 274   What is your lowest weight since surgery? (In pounds) 219.6     Initial Weight: 269 lb 1.6 oz  Current Weight: Weight: 213 lb 11.2 oz  Cumulative weight loss (lbs): 55.4  Last Visits Weight: 221 lb 8 oz    Questions Regarding Co-Morbidities and Health Concerns Reviewed With Patient 2018   Pre-diabetes: Never   Diabetes II: Never   High Blood Pressure: Never   High cholesterol: Never   Heartburn/Reflux: Never   Sleep apnea: Never   PCOS: Never   Back pain: Improved   Joint pain: Improved   Lower leg swelling: Never       Eating Habits 2018   How many meals do you eat per day? 3   Do you snack between meals? Sometimes   How much food are you eating at  "each meal? Less than 1/2 cup   Are you able to separate your meals and liquids by at least 30 minutes? Yes   Are you able to avoid liquid calories? Yes       Exercise Questions Reviewed With Patient 6/25/2018   How often do you exercise? 3 to 4 times per week   What is the duration of your exercise (in minutes)? 60+ Minutes   What types of exercise do you do? walking, gym membership, group fitness classes   What keeps you from being more active?  I am as active as I can possbily be       Social History:      6/25/2018   Are you smoking? No   Are you drinking alcohol? No       Medications:  Current Outpatient Prescriptions   Medication     calcium carb-cholecalciferol 600-500 MG-UNIT CAPS     calcium carbonate (OS-VICTOR M 500 MG [place]. [place]) 500 MG tablet     cyanocolbalamin (VITAMIN  B-12) 100 MCG tablet     Multiple Vitamins-Minerals (MULTIVITAMIN & MINERAL PO)     triamterene-hydrochlorothiazide (MAXZIDE-25) 37.5-25 MG per tablet     ursodiol (ACTIGALL) 300 MG capsule     VITAMIN D, CHOLECALCIFEROL, PO     No current facility-administered medications for this visit.          6/25/2018   Do you avoid NSAIDs such as (Ibuprofen, Aleve, Naproxen, Advil)?   Yes       ROS:  GI:      6/25/2018   Vomiting: No   Diarrhea: No   Constipation: No   Swallowing trouble: No   Abdominal pain: No   Heartburn: No   Rash in skin folds: No   Depression: No   Stress urinary incontinence No     Skin:   BAR RBS ROS - SKIN 6/25/2018   Rash in skin folds: No     Psych:      6/25/2018   Depression: No   Anxiety: No     Female Only:   BAR RBS ROS - FEMALE ONLY 6/25/2018   Female only: Regular menstrual cycles       LABS/IMAGING/MEDICAL RECORDS REVIEW:     PHYSICAL EXAMINATION:  /54 (BP Location: Left arm, Patient Position: Chair, Cuff Size: Adult Large)  Pulse 55  Temp 97.9  F (36.6  C) (Oral)  Ht 5' 4.57\"  Wt 213 lb 11.2 oz  SpO2 97%  BMI 36.04 kg/m2   General: No apparent distress  Neuro: A & O x 3  Head: Atraumatic, " normocephalic  Eyes: PERRL, EOMI  Skin: warm and dry, no rashes on exposed skin  Respiratory: respirations unlabored  Abdomen: soft NT ND lap sites well healed  Extremities: No LE swelling        ASSESSMENT AND PLAN:      1. 3 months status laparoscopic gastric sleeve  2. Morbid Obesity current BMI: Body mass index is 36.04 kg/(m^2).  3. Post surgical malabsorption:   Labs ordered per protocol.   Follow food plan per dietitian recommendations.   Continue taking recommended post-op vitamins.  4. Return to clinic in 3 months.    I spent a total of 15 minutes face to face with Megan during today's office visit. Over 50% of this time was spent counseling the patient and/or coordinating care.    Again, thank you for allowing me to participate in the care of your patient.      Sincerely,    Johnna Romero PA-C

## 2018-08-08 NOTE — PATIENT INSTRUCTIONS
Goals:  1) Eat 3 meals per day, focus on protein.    2) Consume 60-80 grams of protein/day.  3) Sip on 48-64 oz of fluids/day- between meals only.  4) Eat slowly (>20 min/meal), chewing foods well (to applesauce-like consistency).  5) Limit portions to 1/2 cup/meal.  6) -Take the following after a Sleeve Gastrectomy:    Multivitamin/minerals: adult dose 2 times daily    Iron: 45-60 mg elemental (18-36 mg if low risk) - may partly or fully be covered in multivitamin     Calcium Citrate containing vitamin D: 500 mg 3 times daily or 600 mg 2 times daily    Vitamin B12: sublingual form of at least 500 mcg daily or injection of 1000 mcg monthly     Vitamin D3: 3,000 International Units daily  7) Continue daily exercise     Follow up with RD in 3 months    Soo Simms RD, LD  If you need to schedule or reschedule with a dietitian please call [phone] or [phone].

## 2018-08-11 LAB
ANNOTATION COMMENT IMP: NORMAL
RETINYL PALMITATE SERPL-MCNC: <0.02 MG/L (ref 0–0.1)
VIT A SERPL-MCNC: 0.3 MG/L (ref 0.3–1.2)

## 2020-03-11 ENCOUNTER — HEALTH MAINTENANCE LETTER (OUTPATIENT)
Age: 40
End: 2020-03-11

## 2020-12-18 NOTE — PHARMACY-CONSULT NOTE
Bariatric Consult    Medications evaluated as requested per Bariatric Consult. Patient may swallow tablets/capsules ONLY if less than   inch.  Larger tablets/capsules should be broken, crushed or split.    No medication changes were needed based on the Bariatric Medication Management Policy.    The pharmacist will continue to follow as new medications are ordered.    Sushma Christianson, GloriaD       This 48-year-old woman noticed to anterolateral right thigh mass just over a month ago.  She has not noticed any pain here.  She has a history of ulcerative colitis and left meniscal tear.  I reviewed her patient survey information in the EMR.  The patient also provided a written summary of her surgeries and major medical diagnoses.    On examination she is alert oriented has normal mood and affect and is in no acute distress.  Respirations are regular and unlabored eyes are nonicteric.  In her right lower extremity there is no edema or erythema.  She has full motion of the hip knee and ankle.  She has a normal gait.  She has a palpable nontender mass within the vastus lateralis just above the knee.  Grossly she has a well-perfused leg and is intact neurologically.    Reviewed her MRI which shows a benign fatty deposit with some minor stranding within it consistent with an intramuscular lipoma.  This could also be an atypical lipoma but there is no overt signs of malignancy.    Given that this is a new finding and it could be an atypical lipoma have recommended an excision.  The patient understands that this will cause some muscle soreness with the expectation that she will recover fully.  Atypical lipomas would require increased vigilance afterward as there is some risk of recurrence and malignant transformation.  This patient understands and will plan on getting this taken care of soon.  I have answered all of her questions.

## 2021-01-03 ENCOUNTER — HEALTH MAINTENANCE LETTER (OUTPATIENT)
Age: 41
End: 2021-01-03

## 2021-04-25 ENCOUNTER — HEALTH MAINTENANCE LETTER (OUTPATIENT)
Age: 41
End: 2021-04-25

## 2021-10-10 ENCOUNTER — HEALTH MAINTENANCE LETTER (OUTPATIENT)
Age: 41
End: 2021-10-10

## 2022-05-21 ENCOUNTER — HEALTH MAINTENANCE LETTER (OUTPATIENT)
Age: 42
End: 2022-05-21

## 2022-09-17 ENCOUNTER — HEALTH MAINTENANCE LETTER (OUTPATIENT)
Age: 42
End: 2022-09-17

## 2023-06-04 ENCOUNTER — HEALTH MAINTENANCE LETTER (OUTPATIENT)
Age: 43
End: 2023-06-04

## 2024-02-12 ENCOUNTER — NURSE TRIAGE (OUTPATIENT)
Dept: PEDIATRICS | Facility: CLINIC | Age: 44
End: 2024-02-12
Payer: COMMERCIAL

## 2024-02-12 NOTE — TELEPHONE ENCOUNTER
"Pt calls with feeling dizzy and vertigo  Pt states she was seen in an UC last week in [place]  She was given meclizine and lorazepam and neither really worked  She states that camila has helped in the past     Advised to pt that she should be seen in the ER if she might need those mediations to help  Pt verbalized understanding and agrees to the plan    Thank you  Som Mercado RN on 2/12/2024 at 9:36 AM     Reason for Disposition   Spinning or tilting sensation (vertigo) present now    Answer Assessment - Initial Assessment Questions  1. DESCRIPTION: \"Describe your dizziness.\"      Feeling like her head is in a fish bowl  2. LIGHTHEADED: \"Do you feel lightheaded?\" (e.g., somewhat faint, woozy, weak upon standing)      yes  3. VERTIGO: \"Do you feel like either you or the room is spinning or tilting?\" (i.e. vertigo)      yes  4. SEVERITY: \"How bad is it?\"  \"Do you feel like you are going to faint?\" \"Can you stand and walk?\"    - MILD: Feels slightly dizzy, but walking normally.    - MODERATE: Feels unsteady when walking, but not falling; interferes with normal activities (e.g., school, work).    - SEVERE: Unable to walk without falling, or requires assistance to walk without falling; feels like passing out now.       moderate  5. ONSET:  \"When did the dizziness begin?\"      Started last Monday- took meclizine and lorazepam-didn't really help. Been really bad since saturday  6. AGGRAVATING FACTORS: \"Does anything make it worse?\" (e.g., standing, change in head position)      Dizzy all the time. Feels a bit better when laying down  7. HEART RATE: \"Can you tell me your heart rate?\" \"How many beats in 15 seconds?\"  (Note: not all patients can do this)        72  8. CAUSE: \"What do you think is causing the dizziness?\"      Not sure  9. RECURRENT SYMPTOM: \"Have you had dizziness before?\" If Yes, ask: \"When was the last time?\" \"What happened that time?\"      Yes. Has Meniere's, has not had this for a long time  10. OTHER " "SYMPTOMS: \"Do you have any other symptoms?\" (e.g., fever, chest pain, vomiting, diarrhea, bleeding)        vomiting  11. PREGNANCY: \"Is there any chance you are pregnant?\" \"When was your last menstrual period?\"        No. LMP- right now    Protocols used: Dizziness-A-OH    " - - -

## 2024-02-25 ENCOUNTER — HEALTH MAINTENANCE LETTER (OUTPATIENT)
Age: 44
End: 2024-02-25

## (undated) DEVICE — ESU LIGASURE MARYLAND VESSEL LAP 44CM XLONG LF1944

## (undated) DEVICE — LINEN TOWEL PACK X6 WHITE 5487

## (undated) DEVICE — ENDO POUCH 5X9" 15MM ENDOCATCH II 173049

## (undated) DEVICE — DRSG PRIMAPORE 02X3" 7133

## (undated) DEVICE — STPL ENDO HANDLE GIA ULTRA UNIVERSAL XLONG EGIAUXL

## (undated) DEVICE — ENDO TROCAR 05MM VERSAONE BLADELESS W/STD FIX CAN NONB5STF

## (undated) DEVICE — ENDO TROCAR 15MM VERSAONE BLADELESS W/STD FIX CAN NONB15STF

## (undated) DEVICE — STPL SKIN 35W 059037

## (undated) DEVICE — PREP CHLORAPREP 26ML TINTED ORANGE  260815

## (undated) DEVICE — CLIP APPLIER ENDO 05MM MED/LG 176630

## (undated) DEVICE — ESU GROUND PAD ADULT W/CORD E7507

## (undated) DEVICE — ENDO CANNULA 05MM VERSAONE UNIVERSAL UNVCA5STF

## (undated) DEVICE — Device

## (undated) DEVICE — GLOVE PROTEXIS POWDER FREE SMT 7.5  2D72PT75X

## (undated) DEVICE — STPL ENDO RELOAD 60MM MEDIUM THICK PURPLE EGIA60AMT

## (undated) DEVICE — LINEN TOWEL PACK X30 5481

## (undated) DEVICE — COVER CAMERA IN-LIGHT DISP LT-C02

## (undated) DEVICE — LIGHT HANDLE X1 31140133

## (undated) DEVICE — NDL INSUFFLATION 150MM VERRES 172016

## (undated) RX ORDER — HYDROMORPHONE HYDROCHLORIDE 1 MG/ML
INJECTION, SOLUTION INTRAMUSCULAR; INTRAVENOUS; SUBCUTANEOUS
Status: DISPENSED
Start: 2018-05-01

## (undated) RX ORDER — ONDANSETRON 2 MG/ML
INJECTION INTRAMUSCULAR; INTRAVENOUS
Status: DISPENSED
Start: 2018-05-01

## (undated) RX ORDER — BUPIVACAINE HYDROCHLORIDE 2.5 MG/ML
INJECTION, SOLUTION EPIDURAL; INFILTRATION; INTRACAUDAL
Status: DISPENSED
Start: 2018-05-01

## (undated) RX ORDER — CLINDAMYCIN PHOSPHATE 900 MG/50ML
INJECTION, SOLUTION INTRAVENOUS
Status: DISPENSED
Start: 2018-05-01

## (undated) RX ORDER — SODIUM CHLORIDE, SODIUM LACTATE, POTASSIUM CHLORIDE, CALCIUM CHLORIDE 600; 310; 30; 20 MG/100ML; MG/100ML; MG/100ML; MG/100ML
INJECTION, SOLUTION INTRAVENOUS
Status: DISPENSED
Start: 2018-05-01

## (undated) RX ORDER — FENTANYL CITRATE 50 UG/ML
INJECTION, SOLUTION INTRAMUSCULAR; INTRAVENOUS
Status: DISPENSED
Start: 2018-05-01

## (undated) RX ORDER — SCOLOPAMINE TRANSDERMAL SYSTEM 1 MG/1
PATCH, EXTENDED RELEASE TRANSDERMAL
Status: DISPENSED
Start: 2018-05-01

## (undated) RX ORDER — ACETAMINOPHEN 500 MG
TABLET ORAL
Status: DISPENSED
Start: 2018-05-01

## (undated) RX ORDER — DEXAMETHASONE SODIUM PHOSPHATE 4 MG/ML
INJECTION, SOLUTION INTRA-ARTICULAR; INTRALESIONAL; INTRAMUSCULAR; INTRAVENOUS; SOFT TISSUE
Status: DISPENSED
Start: 2018-05-01

## (undated) RX ORDER — LIDOCAINE HYDROCHLORIDE 20 MG/ML
INJECTION, SOLUTION EPIDURAL; INFILTRATION; INTRACAUDAL; PERINEURAL
Status: DISPENSED
Start: 2018-05-01

## (undated) RX ORDER — PROPOFOL 10 MG/ML
INJECTION, EMULSION INTRAVENOUS
Status: DISPENSED
Start: 2018-05-01

## (undated) RX ORDER — HYDROMORPHONE HCL/0.9% NACL/PF 0.2MG/0.2
SYRINGE (ML) INTRAVENOUS
Status: DISPENSED
Start: 2018-05-01

## (undated) RX ORDER — GABAPENTIN 300 MG/1
CAPSULE ORAL
Status: DISPENSED
Start: 2018-05-01